# Patient Record
Sex: FEMALE | Race: WHITE | Employment: OTHER | URBAN - METROPOLITAN AREA
[De-identification: names, ages, dates, MRNs, and addresses within clinical notes are randomized per-mention and may not be internally consistent; named-entity substitution may affect disease eponyms.]

---

## 2021-01-04 ENCOUNTER — HOSPITAL ENCOUNTER (OUTPATIENT)
Dept: PREADMISSION TESTING | Age: 66
Discharge: HOME | End: 2021-01-04
Payer: MEDICARE

## 2021-01-04 ENCOUNTER — TRANSCRIBE ORDER (OUTPATIENT)
Dept: REGISTRATION | Age: 66
End: 2021-01-04

## 2021-01-04 DIAGNOSIS — M54.2 CERVICALGIA: ICD-10-CM

## 2021-01-04 DIAGNOSIS — M54.2 CERVICALGIA: Primary | ICD-10-CM

## 2021-01-04 LAB
ALBUMIN SERPL-MCNC: 3.7 G/DL (ref 3.4–5)
ALBUMIN/GLOB SERPL: 1.1 {RATIO} (ref 0.8–1.7)
ALP SERPL-CCNC: 121 U/L (ref 45–117)
ALT SERPL-CCNC: 18 U/L (ref 13–56)
ANION GAP SERPL CALC-SCNC: 3 MMOL/L (ref 3–18)
AST SERPL-CCNC: 9 U/L (ref 10–38)
BILIRUB SERPL-MCNC: 0.4 MG/DL (ref 0.2–1)
BUN SERPL-MCNC: 18 MG/DL (ref 7–18)
BUN/CREAT SERPL: 22 (ref 12–20)
CALCIUM SERPL-MCNC: 8.9 MG/DL (ref 8.5–10.1)
CHLORIDE SERPL-SCNC: 106 MMOL/L (ref 100–111)
CO2 SERPL-SCNC: 34 MMOL/L (ref 21–32)
CREAT SERPL-MCNC: 0.81 MG/DL (ref 0.6–1.3)
ERYTHROCYTE [DISTWIDTH] IN BLOOD BY AUTOMATED COUNT: 14.4 % (ref 11.6–14.5)
GLOBULIN SER CALC-MCNC: 3.4 G/DL (ref 2–4)
GLUCOSE SERPL-MCNC: 99 MG/DL (ref 74–99)
HCT VFR BLD AUTO: 41.8 % (ref 35–45)
HGB BLD-MCNC: 13.2 G/DL (ref 12–16)
MCH RBC QN AUTO: 25.7 PG (ref 24–34)
MCHC RBC AUTO-ENTMCNC: 31.6 G/DL (ref 31–37)
MCV RBC AUTO: 81.5 FL (ref 74–97)
PLATELET # BLD AUTO: 243 K/UL (ref 135–420)
PMV BLD AUTO: 9.4 FL (ref 9.2–11.8)
POTASSIUM SERPL-SCNC: 3.7 MMOL/L (ref 3.5–5.5)
PROT SERPL-MCNC: 7.1 G/DL (ref 6.4–8.2)
RBC # BLD AUTO: 5.13 M/UL (ref 4.2–5.3)
SODIUM SERPL-SCNC: 143 MMOL/L (ref 136–145)
WBC # BLD AUTO: 6.1 K/UL (ref 4.6–13.2)

## 2021-01-04 PROCEDURE — 85027 COMPLETE CBC AUTOMATED: CPT

## 2021-01-04 PROCEDURE — 80053 COMPREHEN METABOLIC PANEL: CPT

## 2021-01-04 PROCEDURE — 93005 ELECTROCARDIOGRAM TRACING: CPT

## 2021-01-04 PROCEDURE — 36415 COLL VENOUS BLD VENIPUNCTURE: CPT

## 2021-01-05 PROBLEM — M50.30 DDD (DEGENERATIVE DISC DISEASE), CERVICAL: Status: ACTIVE | Noted: 2021-01-05

## 2021-01-05 PROBLEM — M48.02 CERVICAL SPINAL STENOSIS: Status: ACTIVE | Noted: 2021-01-05

## 2021-01-05 PROBLEM — M50.20 HNP (HERNIATED NUCLEUS PULPOSUS), CERVICAL: Status: ACTIVE | Noted: 2021-01-05

## 2021-01-05 LAB
ATRIAL RATE: 69 BPM
BACTERIA SPEC CULT: NORMAL
BACTERIA SPEC CULT: NORMAL
CALCULATED P AXIS, ECG09: 67 DEGREES
CALCULATED R AXIS, ECG10: 83 DEGREES
CALCULATED T AXIS, ECG11: 12 DEGREES
DIAGNOSIS, 93000: NORMAL
P-R INTERVAL, ECG05: 166 MS
Q-T INTERVAL, ECG07: 418 MS
QRS DURATION, ECG06: 108 MS
QTC CALCULATION (BEZET), ECG08: 447 MS
SERVICE CMNT-IMP: NORMAL
VENTRICULAR RATE, ECG03: 69 BPM

## 2021-01-05 RX ORDER — SODIUM CHLORIDE 0.9 % (FLUSH) 0.9 %
5-40 SYRINGE (ML) INJECTION EVERY 8 HOURS
Status: CANCELLED | OUTPATIENT
Start: 2021-01-05

## 2021-01-05 RX ORDER — SODIUM CHLORIDE 0.9 % (FLUSH) 0.9 %
5-40 SYRINGE (ML) INJECTION AS NEEDED
Status: CANCELLED | OUTPATIENT
Start: 2021-01-05

## 2021-01-12 NOTE — H&P
Patient Name:  Karyna Rosa   YOB: 1955    Chief Complaint:  L4-5 and L5-S1 spondylolisthesis and degenerative disc disease of the cervical and lumbar spine. History of Chief Complaint:  Ms. Marina Mcginnis is a 78-year-old female who is being seen for L4-5 and L5-S1 spondylolisthesis and degenerative disc disease of the cervical and lumbar spine. She has had problems with lower back pain and spasms as well as neck pain and occasional bilateral upper extremity tingling. Her symptoms have been severe for the past three weeks. She injured her back at age 10. She says muscle relaxers and steroids used to help. The pain is now constant. She has problems with her balance with occasional falls. She has a shuffling gait and leans to the left. The pain is worse with activity and lifting. Aleve seems to help some. Nonsteroidal anti-inflammatories give her GI upset after having gastric bypass done 20 years ago. She has had no chiropractic treatment, no physical therapy, no injections, and no previous spinal surgeries. She had a DEXA scan done in 2019 which showed osteopenia. She says the epidural steroid injection seemed to help with her back quite a bit. She is standing quite a bit better and walking quite a bit better. There is not as much numbness and tingling. Her neck is still giving her problems and pain. She has numbness and tingling going into both arms. She has soreness with turning her head. She has headaches at times. She has pain shooting down to her right shoulder.     Past Medical/Surgical History:    Disease/Disorder Date Side Surgery Date Side Comment   Arthritis         Depression         Diabetes         High blood pressure         IBS - Irritable bowel syndrome         Osteopenia         Social anxiety disorder         Thyroid disease            Appendectomy 1997        Arthroscopy knee 1999 left       Breast reconstruction 2005        Gastric bypass 2003       Allergies:    Ingredient Reaction Medication Name Comment   SULFA (SULFONAMIDE ANTIBIOTICS)      MEPERIDINE      AMOXICILLIN TRIHYDRATE      ACETAMINOPHEN      OXYCODONE HCL      CODEINE      POTASSIUM CLAVULANATE        Current Medications:    Medication Directions   buspirone 5 mg tablet take 1 tablet by oral route 2 times every day   Effexor  mg capsule,extended release take 2 capsule by oral route  every day   triamterene 37.5 mg-hydrochlorothiazide 25 mg tablet    Xanax 2 mg tablet take 1 tablet by oral route as needed   Xanax XR 1 mg tablet,extended release take 1 tablet by oral route  every day     Social History:      SMOKING  Status Tobacco Type Units Per Day Yrs Used   Never smoker        ALCOHOL  There is no history of alcohol use. Family History:    Disease Detail Family Member Age Cause of Death Comments   Family history of Heart disease       Family history of High blood pressure       Alcoholism Brother  N    Arthritis Brother  N    Cancer, unknown Sister  N    Alcoholism Sister  N    Arthritis Sister  N    Diabetes mellitus Sister  N    Diabetes mellitus Father  N    Stroke Mother  N      Review of Systems:    Pertinent positives include anxiety, depression, joint stiffness, loss of balance, muscle weakness and numbness/tingling.   Pertinent negatives include blurred vision, chest pain, chills, cold, discharge of the eyes, dizziness, double vision, fever, headache, hearing loss, heart murmur, itching of the eyes, palpitations, redness of the eyes, rheumatic fever, ringing in ears, sore throat/hoarseness, weight change, abdominal pain, bipolar disorder, bladder/kidney infection, bloody stool, blood in urine, burning sensation, changes in mood, chronic cough, diarrhea, difficulty breathing, difficulty swallowing, fainting, frequent urinating, fracture/dislocation, gas/bloating, gout, hemorrhoids, incontinence, joint pain, memory loss, nausea/vomiting, pain on breathing, painful urination, psoriasis, rash/itching, Raynaud's phenomenon, rheumatoid disease, seizure disorder, shortness of breath, sprain/strain, swelling of feet, tendonitis, varicose veins and wheezing. Vitals:  Date BP Pulse Temp (F) Resp. (per min.) Height (Total in.) Weight (lbs.) BMI   05/29/2020     66.00 198.00 31.96   03/28/2017     66.00  31.96     Physical Examination:    General:  The patient appears healthy. Cardiovascular:  Arterial pulses are normal.  Skin:  The skin is normal appearing with no contusions or wounds noted. Heart- RRR  Lungs-CTA denia  Abdomen- +BS,soft,nontender  Musculoskeletal:  There is tenderness around the PSIS bilaterally. The thoracolumbar spine has normal kyphosis, a normal appearance, and no scoliosis. There is full range of motion of the cervical, thoracic, and lumbar spine and of the hips, knees, and ankles. Straight leg raising and crossed straight leg raising tests are negative. Neurological:  She has a positive Lhermitte's sign. There is no weakness in the thoracic, lumbar, or sacral spine or in the lower extremities or hips. Deep tendon reflexes are present and normal bilaterally. Ankle and knee jerks are normal with no clonus. Radiograph Examination:  AP, lateral, bilateral oblique, flexion and extension views of the cervical spine were obtained and interpreted in the office 5/29/2020 and reveal C5 to C7 degenerative disc disease. Review of her MRI scan of the cervical spine [facility] 6/22/2020 reveals severe spinal stenosis at C5-6 with degenerative disc disease at C6-7. Plan: Ms. Lis Combs, her , and I had a long discussion about the treatments for her cervical spine and degenerative disc disease including surgical intervention, the risks, and benefits as well as the different surgical approaches and decision making. We also discussed nonsurgical care for this condition including medications, injections, physical therapy, rehabilitation, activity modification, and brace utilization.  At this point, she would like to proceed with operative intervention. We will plan for C5-6 and C6-7 ACDF at her earliest convenience. We did discuss my relationship with [facility]. The risks versus the benefits as well as the alternatives were fully explained to the patient. Risks include, but are not limited to, paralysis, death, heart attack, stroke, pulmonary embolism, deep vein thrombosis, infection, failure to relieve pain, increase in back or arm pain, reherniation, scarring, spinal fluid leak, bowel or bladder dysfunction, bleeding, disease transmission, instrumentation failure, pseudarthrosis, difficulty swallowing, and need for revision surgery. The patient states full understanding of the risks and benefits and wishes to proceed.

## 2021-01-14 ENCOUNTER — HOSPITAL ENCOUNTER (OUTPATIENT)
Dept: PREADMISSION TESTING | Age: 66
Discharge: HOME | End: 2021-01-14
Payer: MEDICARE

## 2021-01-14 PROCEDURE — U0003 INFECTIOUS AGENT DETECTION BY NUCLEIC ACID (DNA OR RNA); SEVERE ACUTE RESPIRATORY SYNDROME CORONAVIRUS 2 (SARS-COV-2) (CORONAVIRUS DISEASE [COVID-19]), AMPLIFIED PROBE TECHNIQUE, MAKING USE OF HIGH THROUGHPUT TECHNOLOGIES AS DESCRIBED BY CMS-2020-01-R: HCPCS

## 2021-01-15 LAB — SARS-COV-2, COV2NT: NOT DETECTED

## 2021-01-20 ENCOUNTER — APPOINTMENT (OUTPATIENT)
Dept: GENERAL RADIOLOGY | Age: 66
End: 2021-01-20
Attending: ORTHOPAEDIC SURGERY
Payer: MEDICARE

## 2021-01-20 ENCOUNTER — ANESTHESIA (OUTPATIENT)
Dept: SURGERY | Age: 66
End: 2021-01-20
Payer: MEDICARE

## 2021-01-20 ENCOUNTER — HOSPITAL ENCOUNTER (OUTPATIENT)
Age: 66
Discharge: HOME HEALTH | End: 2021-01-20
Attending: ORTHOPAEDIC SURGERY | Admitting: ORTHOPAEDIC SURGERY
Payer: MEDICARE

## 2021-01-20 ENCOUNTER — ANESTHESIA EVENT (OUTPATIENT)
Dept: SURGERY | Age: 66
End: 2021-01-20
Payer: MEDICARE

## 2021-01-20 VITALS
HEIGHT: 66 IN | WEIGHT: 214 LBS | OXYGEN SATURATION: 96 % | RESPIRATION RATE: 18 BRPM | HEART RATE: 75 BPM | BODY MASS INDEX: 34.39 KG/M2 | TEMPERATURE: 97.3 F | SYSTOLIC BLOOD PRESSURE: 131 MMHG | DIASTOLIC BLOOD PRESSURE: 69 MMHG

## 2021-01-20 DIAGNOSIS — M48.02 CERVICAL SPINAL STENOSIS: Primary | ICD-10-CM

## 2021-01-20 PROBLEM — M50.20 HNP (HERNIATED NUCLEUS PULPOSUS), CERVICAL: Status: RESOLVED | Noted: 2021-01-05 | Resolved: 2021-01-20

## 2021-01-20 LAB
ABO + RH BLD: NORMAL
BLOOD GROUP ANTIBODIES SERPL: NORMAL
SPECIMEN EXP DATE BLD: NORMAL

## 2021-01-20 PROCEDURE — 76060000034 HC ANESTHESIA 1.5 TO 2 HR: Performed by: ORTHOPAEDIC SURGERY

## 2021-01-20 PROCEDURE — 74011000272 HC RX REV CODE- 272: Performed by: ORTHOPAEDIC SURGERY

## 2021-01-20 PROCEDURE — 77030034475 HC MISC IMPL SPN: Performed by: ORTHOPAEDIC SURGERY

## 2021-01-20 PROCEDURE — 77030006643: Performed by: STUDENT IN AN ORGANIZED HEALTH CARE EDUCATION/TRAINING PROGRAM

## 2021-01-20 PROCEDURE — C9359 IMPLNT,BON VOID FILLER-PUTTY: HCPCS | Performed by: ORTHOPAEDIC SURGERY

## 2021-01-20 PROCEDURE — 74011000250 HC RX REV CODE- 250: Performed by: ORTHOPAEDIC SURGERY

## 2021-01-20 PROCEDURE — 74011250636 HC RX REV CODE- 250/636: Performed by: NURSE ANESTHETIST, CERTIFIED REGISTERED

## 2021-01-20 PROCEDURE — 74011250636 HC RX REV CODE- 250/636: Performed by: STUDENT IN AN ORGANIZED HEALTH CARE EDUCATION/TRAINING PROGRAM

## 2021-01-20 PROCEDURE — 76210000021 HC REC RM PH II 0.5 TO 1 HR: Performed by: ORTHOPAEDIC SURGERY

## 2021-01-20 PROCEDURE — 77030040361 HC SLV COMPR DVT MDII -B: Performed by: ORTHOPAEDIC SURGERY

## 2021-01-20 PROCEDURE — 77030003029 HC SUT VCRL J&J -B: Performed by: ORTHOPAEDIC SURGERY

## 2021-01-20 PROCEDURE — 76010000153 HC OR TIME 1.5 TO 2 HR: Performed by: ORTHOPAEDIC SURGERY

## 2021-01-20 PROCEDURE — 74011000250 HC RX REV CODE- 250: Performed by: NURSE ANESTHETIST, CERTIFIED REGISTERED

## 2021-01-20 PROCEDURE — 77030038552 HC DRN WND MDII -A: Performed by: ORTHOPAEDIC SURGERY

## 2021-01-20 PROCEDURE — 77030008683 HC TU ET CUF COVD -A: Performed by: STUDENT IN AN ORGANIZED HEALTH CARE EDUCATION/TRAINING PROGRAM

## 2021-01-20 PROCEDURE — 77030020782 HC GWN BAIR PAWS FLX 3M -B: Performed by: ORTHOPAEDIC SURGERY

## 2021-01-20 PROCEDURE — 77030002986 HC SUT PROL J&J -A: Performed by: ORTHOPAEDIC SURGERY

## 2021-01-20 PROCEDURE — 76210000016 HC OR PH I REC 1 TO 1.5 HR: Performed by: ORTHOPAEDIC SURGERY

## 2021-01-20 PROCEDURE — 77030008462 HC STPLR SKN PROX J&J -A: Performed by: ORTHOPAEDIC SURGERY

## 2021-01-20 PROCEDURE — 86901 BLOOD TYPING SEROLOGIC RH(D): CPT

## 2021-01-20 PROCEDURE — 36415 COLL VENOUS BLD VENIPUNCTURE: CPT

## 2021-01-20 PROCEDURE — 77030020268 HC MISC GENERAL SUPPLY: Performed by: ORTHOPAEDIC SURGERY

## 2021-01-20 PROCEDURE — 77030010507 HC ADH SKN DERMBND J&J -B: Performed by: ORTHOPAEDIC SURGERY

## 2021-01-20 PROCEDURE — 77030008477 HC STYL SATN SLP COVD -A: Performed by: STUDENT IN AN ORGANIZED HEALTH CARE EDUCATION/TRAINING PROGRAM

## 2021-01-20 PROCEDURE — 77030014650 HC SEAL MTRX FLOSEL BAXT -C: Performed by: ORTHOPAEDIC SURGERY

## 2021-01-20 PROCEDURE — 2709999900 HC NON-CHARGEABLE SUPPLY: Performed by: ORTHOPAEDIC SURGERY

## 2021-01-20 PROCEDURE — 77030004402 HC BUR NEUR STRY -C: Performed by: ORTHOPAEDIC SURGERY

## 2021-01-20 PROCEDURE — 74011250636 HC RX REV CODE- 250/636: Performed by: ORTHOPAEDIC SURGERY

## 2021-01-20 PROCEDURE — C1713 ANCHOR/SCREW BN/BN,TIS/BN: HCPCS | Performed by: ORTHOPAEDIC SURGERY

## 2021-01-20 PROCEDURE — 77030009863 HC PIN CERV DISTR SYNT -B: Performed by: ORTHOPAEDIC SURGERY

## 2021-01-20 PROCEDURE — C1889 IMPLANT/INSERT DEVICE, NOC: HCPCS | Performed by: ORTHOPAEDIC SURGERY

## 2021-01-20 RX ORDER — METOCLOPRAMIDE HYDROCHLORIDE 5 MG/ML
INJECTION INTRAMUSCULAR; INTRAVENOUS AS NEEDED
Status: DISCONTINUED | OUTPATIENT
Start: 2021-01-20 | End: 2021-01-20 | Stop reason: HOSPADM

## 2021-01-20 RX ORDER — FENTANYL CITRATE 50 UG/ML
50 INJECTION, SOLUTION INTRAMUSCULAR; INTRAVENOUS
Status: DISCONTINUED | OUTPATIENT
Start: 2021-01-20 | End: 2021-01-20 | Stop reason: HOSPADM

## 2021-01-20 RX ORDER — NALOXONE HYDROCHLORIDE 4 MG/.1ML
SPRAY NASAL
Qty: 1 EACH | Refills: 0 | Status: SHIPPED | OUTPATIENT
Start: 2021-01-20

## 2021-01-20 RX ORDER — PHENYLEPHRINE HCL IN 0.9% NACL 1 MG/10 ML
SYRINGE (ML) INTRAVENOUS AS NEEDED
Status: DISCONTINUED | OUTPATIENT
Start: 2021-01-20 | End: 2021-01-20 | Stop reason: HOSPADM

## 2021-01-20 RX ORDER — GLYCOPYRROLATE 0.2 MG/ML
INJECTION INTRAMUSCULAR; INTRAVENOUS AS NEEDED
Status: DISCONTINUED | OUTPATIENT
Start: 2021-01-20 | End: 2021-01-20 | Stop reason: HOSPADM

## 2021-01-20 RX ORDER — SODIUM CHLORIDE, SODIUM LACTATE, POTASSIUM CHLORIDE, CALCIUM CHLORIDE 600; 310; 30; 20 MG/100ML; MG/100ML; MG/100ML; MG/100ML
50 INJECTION, SOLUTION INTRAVENOUS CONTINUOUS
Status: DISCONTINUED | OUTPATIENT
Start: 2021-01-20 | End: 2021-01-20 | Stop reason: HOSPADM

## 2021-01-20 RX ORDER — ROCURONIUM BROMIDE 10 MG/ML
INJECTION, SOLUTION INTRAVENOUS AS NEEDED
Status: DISCONTINUED | OUTPATIENT
Start: 2021-01-20 | End: 2021-01-20 | Stop reason: HOSPADM

## 2021-01-20 RX ORDER — PROPOFOL 10 MG/ML
INJECTION, EMULSION INTRAVENOUS AS NEEDED
Status: DISCONTINUED | OUTPATIENT
Start: 2021-01-20 | End: 2021-01-20 | Stop reason: HOSPADM

## 2021-01-20 RX ORDER — SODIUM CHLORIDE 0.9 % (FLUSH) 0.9 %
5-40 SYRINGE (ML) INJECTION AS NEEDED
Status: DISCONTINUED | OUTPATIENT
Start: 2021-01-20 | End: 2021-01-20 | Stop reason: HOSPADM

## 2021-01-20 RX ORDER — HYDROMORPHONE HYDROCHLORIDE 2 MG/ML
0.5 INJECTION, SOLUTION INTRAMUSCULAR; INTRAVENOUS; SUBCUTANEOUS AS NEEDED
Status: DISCONTINUED | OUTPATIENT
Start: 2021-01-20 | End: 2021-01-20 | Stop reason: HOSPADM

## 2021-01-20 RX ORDER — MIDAZOLAM HYDROCHLORIDE 1 MG/ML
INJECTION, SOLUTION INTRAMUSCULAR; INTRAVENOUS AS NEEDED
Status: DISCONTINUED | OUTPATIENT
Start: 2021-01-20 | End: 2021-01-20 | Stop reason: HOSPADM

## 2021-01-20 RX ORDER — SODIUM CHLORIDE 0.9 G/100ML
IRRIGANT IRRIGATION AS NEEDED
Status: DISCONTINUED | OUTPATIENT
Start: 2021-01-20 | End: 2021-01-20 | Stop reason: HOSPADM

## 2021-01-20 RX ORDER — DIPHENHYDRAMINE HYDROCHLORIDE 50 MG/ML
12.5 INJECTION, SOLUTION INTRAMUSCULAR; INTRAVENOUS
Status: DISCONTINUED | OUTPATIENT
Start: 2021-01-20 | End: 2021-01-20 | Stop reason: HOSPADM

## 2021-01-20 RX ORDER — CEFAZOLIN SODIUM/WATER 2 G/20 ML
2 SYRINGE (ML) INTRAVENOUS ONCE
Status: COMPLETED | OUTPATIENT
Start: 2021-01-20 | End: 2021-01-20

## 2021-01-20 RX ORDER — ONDANSETRON 2 MG/ML
INJECTION INTRAMUSCULAR; INTRAVENOUS AS NEEDED
Status: DISCONTINUED | OUTPATIENT
Start: 2021-01-20 | End: 2021-01-20 | Stop reason: HOSPADM

## 2021-01-20 RX ORDER — LIDOCAINE HYDROCHLORIDE 20 MG/ML
INJECTION, SOLUTION EPIDURAL; INFILTRATION; INTRACAUDAL; PERINEURAL AS NEEDED
Status: DISCONTINUED | OUTPATIENT
Start: 2021-01-20 | End: 2021-01-20 | Stop reason: HOSPADM

## 2021-01-20 RX ORDER — EPHEDRINE SULFATE/0.9% NACL/PF 50 MG/5 ML
SYRINGE (ML) INTRAVENOUS AS NEEDED
Status: DISCONTINUED | OUTPATIENT
Start: 2021-01-20 | End: 2021-01-20 | Stop reason: HOSPADM

## 2021-01-20 RX ORDER — BUPIVACAINE HYDROCHLORIDE 2.5 MG/ML
INJECTION, SOLUTION EPIDURAL; INFILTRATION; INTRACAUDAL AS NEEDED
Status: DISCONTINUED | OUTPATIENT
Start: 2021-01-20 | End: 2021-01-20 | Stop reason: HOSPADM

## 2021-01-20 RX ORDER — NALBUPHINE HYDROCHLORIDE 10 MG/ML
5 INJECTION, SOLUTION INTRAMUSCULAR; INTRAVENOUS; SUBCUTANEOUS
Status: DISCONTINUED | OUTPATIENT
Start: 2021-01-20 | End: 2021-01-20 | Stop reason: HOSPADM

## 2021-01-20 RX ORDER — OXYCODONE AND ACETAMINOPHEN 5; 325 MG/1; MG/1
1-1.5 TABLET ORAL
Qty: 84 TAB | Refills: 0 | Status: SHIPPED | OUTPATIENT
Start: 2021-01-20 | End: 2021-01-27

## 2021-01-20 RX ORDER — HYDROMORPHONE HYDROCHLORIDE 1 MG/ML
INJECTION, SOLUTION INTRAMUSCULAR; INTRAVENOUS; SUBCUTANEOUS AS NEEDED
Status: DISCONTINUED | OUTPATIENT
Start: 2021-01-20 | End: 2021-01-20 | Stop reason: HOSPADM

## 2021-01-20 RX ORDER — KETAMINE HCL IN 0.9 % NACL 50 MG/5 ML
SYRINGE (ML) INTRAVENOUS AS NEEDED
Status: DISCONTINUED | OUTPATIENT
Start: 2021-01-20 | End: 2021-01-20 | Stop reason: HOSPADM

## 2021-01-20 RX ORDER — NEOSTIGMINE METHYLSULFATE 1 MG/ML
INJECTION, SOLUTION INTRAVENOUS AS NEEDED
Status: DISCONTINUED | OUTPATIENT
Start: 2021-01-20 | End: 2021-01-20 | Stop reason: HOSPADM

## 2021-01-20 RX ORDER — SUCCINYLCHOLINE CHLORIDE 100 MG/5ML
SYRINGE (ML) INTRAVENOUS AS NEEDED
Status: DISCONTINUED | OUTPATIENT
Start: 2021-01-20 | End: 2021-01-20 | Stop reason: HOSPADM

## 2021-01-20 RX ORDER — NALOXONE HYDROCHLORIDE 0.4 MG/ML
0.04 INJECTION, SOLUTION INTRAMUSCULAR; INTRAVENOUS; SUBCUTANEOUS
Status: DISCONTINUED | OUTPATIENT
Start: 2021-01-20 | End: 2021-01-20 | Stop reason: HOSPADM

## 2021-01-20 RX ORDER — SODIUM CHLORIDE 0.9 % (FLUSH) 0.9 %
5-40 SYRINGE (ML) INJECTION EVERY 8 HOURS
Status: DISCONTINUED | OUTPATIENT
Start: 2021-01-20 | End: 2021-01-20 | Stop reason: HOSPADM

## 2021-01-20 RX ORDER — CYCLOBENZAPRINE HCL 10 MG
5-10 TABLET ORAL
Qty: 60 TAB | Refills: 0 | Status: SHIPPED | OUTPATIENT
Start: 2021-01-20 | End: 2021-02-09

## 2021-01-20 RX ORDER — DEXAMETHASONE SODIUM PHOSPHATE 4 MG/ML
INJECTION, SOLUTION INTRA-ARTICULAR; INTRALESIONAL; INTRAMUSCULAR; INTRAVENOUS; SOFT TISSUE AS NEEDED
Status: DISCONTINUED | OUTPATIENT
Start: 2021-01-20 | End: 2021-01-20 | Stop reason: HOSPADM

## 2021-01-20 RX ORDER — SODIUM CHLORIDE, SODIUM LACTATE, POTASSIUM CHLORIDE, CALCIUM CHLORIDE 600; 310; 30; 20 MG/100ML; MG/100ML; MG/100ML; MG/100ML
125 INJECTION, SOLUTION INTRAVENOUS CONTINUOUS
Status: DISCONTINUED | OUTPATIENT
Start: 2021-01-20 | End: 2021-01-20 | Stop reason: HOSPADM

## 2021-01-20 RX ADMIN — Medication 100 MCG: at 14:08

## 2021-01-20 RX ADMIN — Medication 10 MG: at 13:52

## 2021-01-20 RX ADMIN — Medication 10 MG: at 14:05

## 2021-01-20 RX ADMIN — MIDAZOLAM HYDROCHLORIDE 4 MG: 1 INJECTION, SOLUTION INTRAMUSCULAR; INTRAVENOUS at 13:44

## 2021-01-20 RX ADMIN — Medication 100 MCG: at 13:56

## 2021-01-20 RX ADMIN — Medication 3 MG: at 15:14

## 2021-01-20 RX ADMIN — ROCURONIUM BROMIDE 5 MG: 10 INJECTION, SOLUTION INTRAVENOUS at 13:49

## 2021-01-20 RX ADMIN — SODIUM CHLORIDE, SODIUM LACTATE, POTASSIUM CHLORIDE, AND CALCIUM CHLORIDE 125 ML/HR: 600; 310; 30; 20 INJECTION, SOLUTION INTRAVENOUS at 12:51

## 2021-01-20 RX ADMIN — Medication 100 MG: at 13:49

## 2021-01-20 RX ADMIN — Medication 10 MG: at 14:15

## 2021-01-20 RX ADMIN — SODIUM CHLORIDE, SODIUM LACTATE, POTASSIUM CHLORIDE, AND CALCIUM CHLORIDE: 600; 310; 30; 20 INJECTION, SOLUTION INTRAVENOUS at 15:21

## 2021-01-20 RX ADMIN — LIDOCAINE HYDROCHLORIDE 100 MG: 20 INJECTION, SOLUTION EPIDURAL; INFILTRATION; INTRACAUDAL; PERINEURAL at 13:49

## 2021-01-20 RX ADMIN — GLYCOPYRROLATE 0.4 MG: 0.2 INJECTION INTRAMUSCULAR; INTRAVENOUS at 15:14

## 2021-01-20 RX ADMIN — HYDROMORPHONE HYDROCHLORIDE 1 MG: 1 INJECTION, SOLUTION INTRAMUSCULAR; INTRAVENOUS; SUBCUTANEOUS at 13:44

## 2021-01-20 RX ADMIN — Medication 20 MG: at 14:47

## 2021-01-20 RX ADMIN — METOCLOPRAMIDE 10 MG: 5 INJECTION, SOLUTION INTRAMUSCULAR; INTRAVENOUS at 14:03

## 2021-01-20 RX ADMIN — HYDROMORPHONE HYDROCHLORIDE 1 MG: 1 INJECTION, SOLUTION INTRAMUSCULAR; INTRAVENOUS; SUBCUTANEOUS at 15:20

## 2021-01-20 RX ADMIN — PROPOFOL 150 MG: 10 INJECTION, EMULSION INTRAVENOUS at 13:49

## 2021-01-20 RX ADMIN — Medication 30 MG: at 14:29

## 2021-01-20 RX ADMIN — DEXAMETHASONE SODIUM PHOSPHATE 8 MG: 4 INJECTION, SOLUTION INTRAMUSCULAR; INTRAVENOUS at 14:03

## 2021-01-20 RX ADMIN — GLYCOPYRROLATE 0.1 MG: 0.2 INJECTION INTRAMUSCULAR; INTRAVENOUS at 13:44

## 2021-01-20 RX ADMIN — SODIUM CHLORIDE, SODIUM LACTATE, POTASSIUM CHLORIDE, AND CALCIUM CHLORIDE: 600; 310; 30; 20 INJECTION, SOLUTION INTRAVENOUS at 14:29

## 2021-01-20 RX ADMIN — ONDANSETRON HYDROCHLORIDE 4 MG: 2 INJECTION INTRAMUSCULAR; INTRAVENOUS at 14:03

## 2021-01-20 RX ADMIN — HYDROMORPHONE HYDROCHLORIDE 0.5 MG: 2 INJECTION, SOLUTION INTRAMUSCULAR; INTRAVENOUS; SUBCUTANEOUS at 16:13

## 2021-01-20 RX ADMIN — Medication 2 G: at 14:04

## 2021-01-20 RX ADMIN — ROCURONIUM BROMIDE 30 MG: 10 INJECTION, SOLUTION INTRAVENOUS at 14:00

## 2021-01-20 NOTE — ANESTHESIA POSTPROCEDURE EVALUATION
Post-Anesthesia Evaluation and Assessment    Cardiovascular Function/Vital Signs  Visit Vitals  /73   Pulse 81   Temp 36.2 °C (97.1 °F)   Resp 12   Ht 5' 6\" (1.676 m)   Wt 97.1 kg (214 lb)   SpO2 98%   BMI 34.54 kg/m²       Patient is status post Procedure(s):  CERVICAL 5-7 ANTERIOR CERVICAL DISC FUSION WITH C-ARM. Nausea/Vomiting: Controlled. Postoperative hydration reviewed and adequate. Pain:  Pain Scale 1: Numeric (0 - 10) (01/20/21 1620)  Pain Intensity 1: 3 (01/20/21 1620)   Managed. Neurological Status:   Neuro (WDL): Exceptions to WDL (01/20/21 1243)   At baseline. Mental Status and Level of Consciousness: Baseline and appropriate for discharge. Pulmonary Status:   O2 Device: Nasal cannula (01/20/21 1145)   Adequate oxygenation and airway patent. Complications related to anesthesia: None    Post-anesthesia assessment completed. No concerns. Patient has met all discharge requirements.     Signed By: Estefanía Taylor MD    January 20, 2021

## 2021-01-20 NOTE — PERIOP NOTES
Reviewed PTA medication list with patient/caregiver and patient/caregiver denies any additional medications. Patient admits to having a responsible adult care for them at home for at least 24 hours after surgery. Patient encouraged to use gown warming system and informed that using said warming gown to regulate body temperature prior to a procedure has been shown to help reduce the risks of blood clots and infection. Patient's pharmacy of choice verified and documented in PTA medication section. Dual skin assessment & fall risk band verification completed with SONJA Rivera RN.      Assessment completed by Estevan Noel RN

## 2021-01-20 NOTE — PROGRESS NOTES
Pt left stable with family*. Folder with discharge instruction and prescription given. Arm band shredded. IV access discontinue. Dressing CDI. No further questions. End of PeriOp care.       01/20/21 1725   Modified Caroline Score   Activity 2   Respiration 2   Circulation 2   Consciousness 2   O2 Saturation 2   Caroline Score 10   Vital Signs   Temp 97.3 °F (36.3 °C)   Pulse (Heart Rate) 75   Resp Rate 18   /69   Oxygen Therapy   O2 Sat (%) 96 %   Pain   Pain Scale 1 FLACC   Pain Intensity 1 0   Pain Reassessment 1 Yes

## 2021-01-20 NOTE — DISCHARGE INSTRUCTIONS
Dr. Char Pacheco Operative Instructions: Cervical Fusion    *YOU MUST AVOID SMOKING OR BEING AROUND ANYONE WHO SMOKES. AVOID ALL PRODUCTS THAT CONTAIN NICOTINE. DO NOT TAKE IBUPROFEN OR ANTI--INFLAMMATORIES, AS THESE MAY ALTER THE HEALING OF THE FUSION. Diet:   1. Begin with liquids and light foods such as jell-o or soups  2. Advance as tolerated to your regular diet if not nauseated. 3.  Swallowing difficulties may be experienced up to 2 weeks post-operatively. Modify food thickness as necessary. You may also obtain over-the-counter chloraseptic spray. Medications:  1. Strong oral narcotic pain medications have been prescribed for the first few days. Use only as directed. No pain medication is capable of taking away all the pain. Taking your pills at regular intervals will give you the best chance of having less pain. 2. If you need a refill PLEASE PLAN AHEAD. Call our office during regular hours (8-5). 3. Do not combine with alcoholic beverages. 4. Be careful as you walk, climb stairs or drive as mild dizziness is not unusual.  5. Do not take medications that have not been prescribed by your surgeon. 6. You may switch to over the counter pain medication of your choice as you become more comfortable. Activity and Restrictions:  1. You should not drive until you are clear by your surgeon at your post-operative visit. 2.  In regards to your cervical collar, you MUST wear this at all times until your first follow-up appointment. 3.  You should wear the collar even when sleeping. 4.  It can be removed for short periods of time as long as you are keeping your head centered over the shoulders without rotating or looking up or down. 5. You may take short walks inside and outside of your home and climb stairs. 6. You are to avoid work, housework, yard work, snow shoveling, lifting of more than a few pounds, overhead work or strenuous activity.   7. Avoid any excessive stretching or range-of-motion of the neck. Non-painful range-of-motion of the neck is allowed  8. Follow-up with Dr. Katy Bernard in 7-10 days. DRIVIN. You should not drive until after your follow-up appointment. 2.  You can be in a vehicle for short distances, but if you travel any long distance, please stop about every 30 minutes and walk/stretch. 3.  You should NEVER drive while taking narcotic medication. BATHING and INCISION CARE:  1. The incision may be tender to touch or feel numb: this is normal.   2.  Keep the incision clean and dry. Do not get the incision wet for 5 days. The incision will be closed with sutures under the skin and the skin will be glued. 3.  Do not apply any lotions, ointments or oils on the incision. 4.  If you notice any excessive swelling, redness, or persistent drainage around the incision, notify the office immediately. RETURN TO WORK :  1. The decision to return to work will be determined on an individual basis. 2.  Many people who have a strenuous job (construction, heavy labor, etc) may need to be off work for up to 8 weeks. 3.  If you need a work note, please let us know as soon as possible, and not the same day you are planning to return to work. NUTRITION :  1.  Good nutrition is an essential part of healing. 2.  You should eat a balanced diet each day, including fruits, vegetables, dairy products and protein. 3.  Remember to drink plenty of water. 4.  If you have not had a bowel movement within 3 days of surgery, you will need to use a laxative or suppository that can be obtained over-the-counter at your local pharmacy. BONE STIMULATOR:  1. A spinal bone stimulator may be prescribed for you under certain situations. 2.  A nurse will call you if one has been requested and discuss its use for approximately 4-6 months post-op every day. 3.  This device assists in bone healing and fusion.     CALL THE OFFICE:   If you have severe pain unrelieved by the medications, new numbness or tingling in your arms;   If you have a fever of 101.0°F or greater;    If you notice excessive swelling, redness, or persistent drainage from the incision or IV site; The [facility] office number is [phone] from 8:00am to 5:00pm Monday through Friday. After 5:00pm, on weekends, or holidays, please leave a message with our answering service and the doctor on-call will get back to you shortly. Patient armband removed and shredded      DISCHARGE SUMMARY from Nurse    PATIENT INSTRUCTIONS:    After general anesthesia or intravenous sedation, for 24 hours or while taking prescription Narcotics:  · Limit your activities  · Do not drive and operate hazardous machinery  · Do not make important personal or business decisions  · Do  not drink alcoholic beverages  · If you have not urinated within 8 hours after discharge, please contact your surgeon on call. Report the following to your surgeon:  · Excessive pain, swelling, redness or odor of or around the surgical area  · Temperature over 100.5  · Nausea and vomiting lasting longer than 4 hours or if unable to take medications  · Any signs of decreased circulation or nerve impairment to extremity: change in color, persistent  numbness, tingling, coldness or increase pain  · Any questions    What to do at Home:  Recommended activity: Activity as tolerated, Activity as tolerated and no driving for today and Ambulate in house,     If you experience any of the following symptoms as above, please follow up with Dr. Oscar Gomez. *  Please give a list of your current medications to your Primary Care Provider. *  Please update this list whenever your medications are discontinued, doses are      changed, or new medications (including over-the-counter products) are added. *  Please carry medication information at all times in case of emergency situations.     These are general instructions for a healthy lifestyle:    No smoking/ No tobacco products/ Avoid exposure to second hand smoke  Surgeon General's Warning:  Quitting smoking now greatly reduces serious risk to your health. Obesity, smoking, and sedentary lifestyle greatly increases your risk for illness    A healthy diet, regular physical exercise & weight monitoring are important for maintaining a healthy lifestyle    You may be retaining fluid if you have a history of heart failure or if you experience any of the following symptoms:  Weight gain of 3 pounds or more overnight or 5 pounds in a week, increased swelling in our hands or feet or shortness of breath while lying flat in bed. Please call your doctor as soon as you notice any of these symptoms; do not wait until your next office visit. Patient Education        Learning About Coronavirus [phone])  What is coronavirus (COVID-19)? COVID-19 is a disease caused by a new type of coronavirus. This illness was first found in December 2019. It has since spread worldwide. Coronaviruses are a large group of viruses. They cause the common cold. They also cause more serious illnesses like [place] respiratory syndrome (MERS) and severe acute respiratory syndrome (SARS). COVID-19 is caused by a novel coronavirus. That means it's a new type that has not been seen in people before. What are the symptoms? Coronavirus (COVID-19) symptoms may include:  · Fever. · Cough. · Trouble breathing. · Chills or repeated shaking with chills. · Muscle pain. · Headache. · Sore throat. · New loss of taste or smell. · Vomiting. · Diarrhea. In severe cases, COVID-19 can cause pneumonia and make it hard to breathe without help from a machine. It can cause death. How is it diagnosed? COVID-19 is diagnosed with a viral test. This may also be called a PCR test or antigen test. It looks for evidence of the virus in your breathing passages or lungs (respiratory system).   The test is most often done on a sample from the nose, throat, or lungs. It's sometimes done on a sample of saliva. One way a sample is collected is by putting a long swab into the back of your nose. How is it treated? Mild cases of COVID-19 can be treated at home. Serious cases need treatment in the hospital. Treatment may include medicines to reduce symptoms, plus breathing support such as oxygen therapy or a ventilator. Some people may be placed on their belly to help their oxygen levels. Treatments that may help people who have COVID-19 include:  Antiviral medicines. These medicines treat viral infections. Remdesivir is an example. Immune-based therapy. These medicines help the immune system fight COVID-19. One example is bamlanivimab. It's a monoclonal antibody. Blood thinners. These medicines help prevent blood clots. People with severe illness are at risk for blood clots. How can you protect yourself and others? The best way to protect yourself from getting sick is to:  · Avoid areas where there is an outbreak. · Avoid contact with people who may be infected. · Avoid crowds and try to stay at least 6 feet away from other people. · Wash your hands often, especially after you cough or sneeze. Use soap and water, and scrub for at least 20 seconds. If soap and water aren't available, use an alcohol-based hand . · Avoid touching your mouth, nose, and eyes. To help avoid spreading the virus to others:  · Stay home if you are sick or have been exposed to the virus. Don't go to school, work, or public areas. And don't use public transportation, ride-shares, or taxis unless you have no choice. · Wear a cloth face cover if you have to go to public areas. · Cover your mouth with a tissue when you cough or sneeze. Then throw the tissue in the trash and wash your hands right away. · If you're sick:  ? Leave your home only if you need to get medical care. But call the doctor's office first so they know you're coming.  And wear a face cover. ? Wear the face cover whenever you're around other people. It can help stop the spread of the virus when you cough or sneeze. ? Limit contact with pets and people in your home. If possible, stay in a separate bedroom and use a separate bathroom. ? Clean and disinfect your home every day. Use household  and disinfectant wipes or sprays. Take special care to clean things that you grab with your hands. These include doorknobs, remote controls, phones, and handles on your refrigerator and microwave. And don't forget countertops, tabletops, bathrooms, and computer keyboards. When should you call for help? Call 911 anytime you think you may need emergency care. For example, call if you have life-threatening symptoms, such as:    · You have severe trouble breathing. (You can't talk at all.)     · You have constant chest pain or pressure.     · You are severely dizzy or lightheaded.     · You are confused or can't think clearly.     · Your face and lips have a blue color.     · You pass out (lose consciousness) or are very hard to wake up. Call your doctor now or seek immediate medical care if:    · You have moderate trouble breathing. (You can't speak a full sentence.)     · You are coughing up blood (more than about 1 teaspoon).     · You have signs of low blood pressure. These include feeling lightheaded; being too weak to stand; and having cold, pale, clammy skin. Watch closely for changes in your health, and be sure to contact your doctor if:    · Your symptoms get worse.     · You are not getting better as expected. Call before you go to the doctor's office. Follow their instructions. And wear a cloth face cover. Current as of: December 18, 2020               Content Version: 12.7  © 2006-2021 [employer]. Care instructions adapted under license by [employer] (which disclaims liability or warranty for this information).  If you have questions about a medical condition or this instruction, always ask your healthcare professional. [address] any warranty or liability for your use of this information. The discharge information has been reviewed with the patient and spouse. The patient and spouse verbalized understanding. Discharge medications reviewed with the patient and spouse and appropriate educational materials and side effects teaching were provided.   ___________________________________________________________________________________________________________________________________

## 2021-01-20 NOTE — ANESTHESIA PREPROCEDURE EVALUATION
Relevant Problems   No relevant active problems       Anesthetic History   No history of anesthetic complications     Pertinent negatives: No PONV       Review of Systems / Medical History  Patient summary reviewed, nursing notes reviewed and pertinent labs reviewed    Pulmonary  Within defined limits            Pertinent negatives: No COPD, asthma and sleep apnea     Neuro/Psych         Psychiatric history  Pertinent negatives: No seizures and CVA   Cardiovascular    Hypertension            Pertinent negatives: No past MI and CHF  Exercise tolerance: >4 METS     GI/Hepatic/Renal  Within defined limits           Pertinent negatives: No liver disease and renal disease   Endo/Other    Diabetes: well controlled  Hypothyroidism  Arthritis     Other Findings            Physical Exam    Airway  Mallampati: II  TM Distance: 4 - 6 cm  Neck ROM: normal range of motion   Mouth opening: Normal     Cardiovascular  Regular rate and rhythm,  S1 and S2 normal,  no murmur, click, rub, or gallop  Rhythm: regular  Rate: normal         Dental    Dentition: Poor dentition     Pulmonary  Breath sounds clear to auscultation               Abdominal  GI exam deferred       Other Findings            Anesthetic Plan    ASA: 3  Anesthesia type: general          Induction: Intravenous  Anesthetic plan and risks discussed with: Patient

## 2021-01-20 NOTE — OP NOTES
Operative Note      Patient: Kamar Sorto               Sex: female          DOA: 1/20/2021         YOB: 1955      Age:  77 y.o. Preoperative Diagnosis: CERVICALGIA, DISC DEGENERATION C5-6, C6-7, FACET ARTHROSIS, OSTEOPHYTE, STENOSIS CERVICAL REGION, HYPERTENSION    Postoperative Diagnosis:  CERVICALGIA, DISC DEGENERATION C5-6, C6-7, FACET ARTHROSIS, OSTEOPHYTE, STENOSIS CERVICAL REGION, HYPERTENSION    Surgeon: Surgeon(s) and Role:     * Darrel Huang MD - Primary  Assistant: Lisa Ontiveros PA-C    OR Assitance: Physician Assistant: GEORGE Babb  Surg Asst-1: Gerhardt Gimenez    Anesthesia:  General    Procedure:  Procedure(s):  CERVICAL 5-7 ANTERIOR CERVICAL DISC FUSION WITH C-ARM     Estimated Blood Loss: 50cc                 Implants:   Implant Name Type Inv. Item Serial No.  Lot No. LRB No. Used Action   FIBERGRAFT BG PUTTY 2CC    PROSIDYAN_[id number] N/A 1 Implanted   [employer] ACIF 2C CpTi +HA Coated ACIF Wide Cage Peek 05 Degree x8mm     [id number] N/A 1 Implanted   [employer] ACIF 2 C CpTi +HA Coated ACIF Wide Cage Peek 05 Degreex 7mm     [id number] N/A 1 Implanted       Drains: RIC           Complications:  None              Indications: This is a 77y.o. year-old female who presents with significant neck and arm pain worsening ability to do activities of daily living. X-rays and MRI scan revealing spinal stenosis, degenerative disk disease and disk herniation. Having failed conservative care, he comes for operative intervention. Procedure Details:   After appropriate informed consent was obtained, the patient was taken to the operating suite and placed in a supine position on the operating table. Satisfactory general endotracheal anesthesia was induced. All pressure points were carefully padded. Perioperative antibiotics were given. The anterior neck was shaved, prepped, and draped in the usual sterile fashion.   Intraoperative fluoroscopy was used to localize the C5-6 level. A transverse linear incision was made in the left anterior neck directly and was carried down through the subcutaneous tissue. The platysma was divided with electrocautery in a transverse fashion and was undermined both superiorly and inferiorly. Dissection was continued down along the anterior border of the sternocleidomastoid muscle. The carotid artery was palpated and was swept laterally. A plane was identified between the paratracheal musculature and the sternocleidmastoid  into the prevertebral space and was developed both superiorly and inferiorly using blunt dissection. Intraoperative fluoroscopy and a spinal needle were used to localize theC 5-6 disc space. The prevertebral fascia was then incised longitudinally, and the longus colli muscles were swept laterally away from the bony elements of the spine on both sides. A self-retaining retractor with smooth blades was placed in the medial and lateral wound. 14 mm distraction pins were placed in the superior and inferior vertebral bodies. Next, the C5-6 and C6-7 discs were removed completely with curettes and pituitary rongeurs. Cartilaginous endplates were removed. Posterolateral osteophytes were removed using the 2mm Kerrison rongeur. The posterior longitudinal ligament was opened with nerve hook and generous foraminotomies were created bilaterally. The nerve roots and spinal cord were found to be freely decompressed. The wound was then irrigated copiously with antibiotic solution. Meticulous hemostasis was obtained. Osteophytes were removed from the posterior and anterior vertebral bodies with the vicenta. The cartilagenous endplates were also removed from each of the vertebral bodies down to bleeding subchondral bone. The interbody space were then sized for PEEK graft with trial sizers and bone graft, the interbody graft was then filled with bone graft and then impacted into the interbody space.   Each found to have a nice, snug fit. ANTERIOR INSTRUMENTATION:  Next, an anterior cervical plate was placed from C5-7. Screws were then placed sequentially starting with an awl then followed by self-tapping screws. Two screws were placed in each vertebra under fluoroscopic guidance. The screws visualized to locked into the plate with the wings of the screw head snapping under the plate edge. Intraoperative fluoroscopy confirmed the entire construct to be in good position. The wound was once more copiously irrigated. The self-retaining retractor was removed from the wound. Meticulous hemostasis was obtained. The esophagus was inspected and was found to be intact. A RIC drain was inserted. The platysma was closed using interrupted 2-0 Vicryl sutures. The skin edges were closed with 3-0 PDS suture and approximated using Dermabond. A sterile dressing was applied to the wound. The patient was then transferred back to the stretcher where satisfactory general endotracheal anesthesia was reversed. The patient was then taken to the Recovery Room in satisfactory condition. Assistant surgeon was needed throughout the procedure for managing bleeding, improving visualization and closure of the surgical wounds.

## 2021-01-20 NOTE — INTERVAL H&P NOTE
Update History & Physical 
 
The Patient's History and Physical was reviewed with the patient and I examined the patient. There was no change. The surgical site was confirmed by the patient and me. Plan:  The risk, benefits, expected outcome, and alternative to the recommended procedure have been discussed with the patient. Patient understands and wants to proceed with the procedure.  
 
Electronically signed by Angelica Banda MD on 1/20/2021 at 12:47 PM

## 2021-01-20 NOTE — PERIOP NOTES
Discharge instructions completed via phone call to Heather Garcia - opportunity for questions - AVS med list reviewed - tolerating po fluids - pt instructed on RIC drain care

## 2021-01-21 ENCOUNTER — TELEPHONE (OUTPATIENT)
Dept: OTHER | Age: 66
End: 2021-01-21

## 2021-01-21 NOTE — TELEPHONE ENCOUNTER
Juliane Wetzel called for follow up cervical surgery. Drain has been pulled. Discussed using ice, distraction, and repositioning to manage pain besides using medication. Reminded patient not to get the wound wet and to cover it before bathing. Reminded patient the importance of doing exercises as shown. Reminded to wear the neck collar and to follow any neck precautions per provider instructions. Reminded patient to sit up and rest with head elevated to help prevent swelling to the neck. Instructed patient to call provider if having any trouble swallowing. Reminded to healthy eat foods along with drinking plenty of fluids to promote healing. Reminded not  to take medication on an empty stomach to prevent nausea. Reminded to take a stool softener while taking a narcotic due to constipation being a side effect of anesthesia and narcotics. Taking medications as prescribed by provider.      Orthopedic Navigator

## 2021-04-22 ENCOUNTER — TRANSCRIBE ORDER (OUTPATIENT)
Dept: REGISTRATION | Age: 66
End: 2021-04-22

## 2021-04-22 ENCOUNTER — HOSPITAL ENCOUNTER (OUTPATIENT)
Dept: PREADMISSION TESTING | Age: 66
Discharge: HOME | End: 2021-04-22
Payer: MEDICARE

## 2021-04-22 DIAGNOSIS — M54.50 LUMBAGO: Primary | ICD-10-CM

## 2021-04-22 DIAGNOSIS — M54.50 LUMBAGO: ICD-10-CM

## 2021-04-22 LAB
ALBUMIN SERPL-MCNC: 3.4 G/DL (ref 3.4–5)
ALBUMIN/GLOB SERPL: 1 {RATIO} (ref 0.8–1.7)
ALP SERPL-CCNC: 122 U/L (ref 45–117)
ALT SERPL-CCNC: 16 U/L (ref 13–56)
ANION GAP SERPL CALC-SCNC: 0 MMOL/L (ref 3–18)
AST SERPL-CCNC: 10 U/L (ref 10–38)
ATRIAL RATE: 72 BPM
BILIRUB SERPL-MCNC: 0.4 MG/DL (ref 0.2–1)
BUN SERPL-MCNC: 16 MG/DL (ref 7–18)
BUN/CREAT SERPL: 20 (ref 12–20)
CALCIUM SERPL-MCNC: 8.5 MG/DL (ref 8.5–10.1)
CALCULATED P AXIS, ECG09: 72 DEGREES
CALCULATED R AXIS, ECG10: 87 DEGREES
CALCULATED T AXIS, ECG11: 29 DEGREES
CHLORIDE SERPL-SCNC: 105 MMOL/L (ref 100–111)
CO2 SERPL-SCNC: 37 MMOL/L (ref 21–32)
CREAT SERPL-MCNC: 0.81 MG/DL (ref 0.6–1.3)
DIAGNOSIS, 93000: NORMAL
ERYTHROCYTE [DISTWIDTH] IN BLOOD BY AUTOMATED COUNT: 14.1 % (ref 11.6–14.5)
GLOBULIN SER CALC-MCNC: 3.3 G/DL (ref 2–4)
GLUCOSE SERPL-MCNC: 152 MG/DL (ref 74–99)
HCT VFR BLD AUTO: 39.4 % (ref 35–45)
HGB BLD-MCNC: 12.1 G/DL (ref 12–16)
MCH RBC QN AUTO: 24.8 PG (ref 24–34)
MCHC RBC AUTO-ENTMCNC: 30.7 G/DL (ref 31–37)
MCV RBC AUTO: 80.9 FL (ref 74–97)
P-R INTERVAL, ECG05: 170 MS
PLATELET # BLD AUTO: 244 K/UL (ref 135–420)
PMV BLD AUTO: 9.3 FL (ref 9.2–11.8)
POTASSIUM SERPL-SCNC: 3.9 MMOL/L (ref 3.5–5.5)
PROT SERPL-MCNC: 6.7 G/DL (ref 6.4–8.2)
Q-T INTERVAL, ECG07: 410 MS
QRS DURATION, ECG06: 108 MS
QTC CALCULATION (BEZET), ECG08: 448 MS
RBC # BLD AUTO: 4.87 M/UL (ref 4.2–5.3)
SODIUM SERPL-SCNC: 142 MMOL/L (ref 136–145)
VENTRICULAR RATE, ECG03: 72 BPM
WBC # BLD AUTO: 5.1 K/UL (ref 4.6–13.2)

## 2021-04-22 PROCEDURE — 80053 COMPREHEN METABOLIC PANEL: CPT

## 2021-04-22 PROCEDURE — 36415 COLL VENOUS BLD VENIPUNCTURE: CPT

## 2021-04-22 PROCEDURE — 93005 ELECTROCARDIOGRAM TRACING: CPT

## 2021-04-22 PROCEDURE — 85027 COMPLETE CBC AUTOMATED: CPT

## 2021-04-23 LAB
BACTERIA SPEC CULT: NORMAL
BACTERIA SPEC CULT: NORMAL
SERVICE CMNT-IMP: NORMAL

## 2021-05-03 NOTE — H&P
Patient Name:  Cresencio Santana   YOB: 1955    Chief Complaint:  L4-5 and L5-S1 spondylolisthesis and degenerative disc disease of the cervical and lumbar spine. History of Chief Complaint:  Ms. Ashutosh Maradiaga is a 78-year-old female who is being seen for L4-5 and L5-S1 spondylolisthesis and degenerative disc disease of the  lumbar spine. She has had problems with lower back pain and spasms. Her symptoms have been severe for the past year. She injured her back at age 10. She says muscle relaxers and steroids used to help. The pain is now constant. She has problems with her balance with occasional falls. She has a shuffling gait and leans to the left. The pain is worse with activity and lifting. Aleve seems to help some. Nonsteroidal anti-inflammatories give her GI upset after having gastric bypass done 20 years ago. She is s/p C5 to C7 ACDF done 01/20/21. The biggest problem is with her lower back. She has soreness across the lower back. Bending, turning, and twisting causes pain. Standing for any length of time causes pain.     Past Medical/Surgical History:    Disease/Disorder Date Side Surgery Date Side Comment   Arthritis         Depression         Diabetes         High blood pressure         IBS - Irritable bowel syndrome         Osteopenia         Social anxiety disorder         Thyroid disease            Appendectomy 1997        Arthroscopy knee 1999 left       Breast reconstruction 2005        Gastric bypass 2003        Spinal fusion, cervical 01/20/2021  [facility] 02/05/2021 -C5-7     Allergies:    Ingredient Reaction Medication Name Comment   SULFA (SULFONAMIDE ANTIBIOTICS)      MEPERIDINE      AMOXICILLIN TRIHYDRATE      ACETAMINOPHEN      OXYCODONE HCL      CODEINE      POTASSIUM CLAVULANATE        Current Medications:    Medication Directions   buspirone 5 mg tablet take 1 tablet by oral route 2 times every day   Effexor  mg capsule,extended release take 2 capsule by oral route  every day triamterene 37.5 mg-hydrochlorothiazide 25 mg tablet    Xanax 2 mg tablet take 1 tablet by oral route as needed   Xanax XR 1 mg tablet,extended release take 1 tablet by oral route  every day     Social History:      SMOKING  Status Tobacco Type Units Per Day Yrs Used   Never smoker        ALCOHOL  There is no history of alcohol use. Family History:    Disease Detail Family Member Age Cause of Death Comments   Family history of Heart disease       Family history of High blood pressure       Alcoholism Brother  N    Arthritis Brother  N    Cancer, unknown Sister  N    Alcoholism Sister  N    Arthritis Sister  N    Diabetes mellitus Sister  N    Diabetes mellitus Father  N    Stroke Mother  N      Vitals:  Date BP Pulse Temp (F) Resp. (per min.) Height (Total in.) Weight (lbs.) BMI   02/04/2021     66.00  31.96   06/29/2020     66.00  31.96   05/29/2020     66.00  31.96   03/28/2017     66.00  31.96     Physical Examination:    General:  The patient appears healthy. Cardiovascular:  Arterial pulses are normal.  Skin:  The skin is normal appearing with no contusions or wounds noted. Heart- RRR  Lungs-CTA denia  Abdomen- +BS,soft,nontender  Musculoskeletal:  The thoracolumbar spine has normal kyphosis, a normal appearance, and no scoliosis. There is full range of motion of the cervical, thoracic, and lumbar spine and of the hips, knees, and ankles. She has positive straight leg raising on the right. Neurological:  There is no weakness in the thoracic, lumbar, or sacral spine or in the lower extremities or hips. Deep tendon reflexes are present and normal bilaterally. Ankle and knee jerks are normal with no clonus. Radiograph Examination:  AP, lateral, bilateral oblique, flexion and extension views of the lumbar spine were obtained and interpreted in the office 4/15/2021 and reveal L4 to S1 degenerative disc disease and spondylolisthesis at L4-5 and L5-S1.     An AP view of the pelvis was obtained and interpreted in the office  and reveals no periosteal reaction, no medullary lesions, no osteopenia, well-aligned joint spaces, no chondrolysis, and no fractures. Review of her MRI scan of the lumbar spine  [facility] 6/24/2020 reveals spondylolisthesis, degenerative disc disease, and foraminal stenosis at L4-5 and L5-S1. Plan: Ms. Patrick Denis, her , and I had a long discussion about the treatments regarding her back pain, leg pain, radiculopathy, and spinal stenosis including surgical intervention, the risks, and benefits as well as the different surgical approaches and decision making. We also discussed nonsurgical care for this condition including medications, injections, physical therapy, rehabilitation, activity modification, and brace utilization. At this point, she would like to proceed with operative intervention. We will plan for L4 to S1 decompression and fusion at her earliest convenience. The risks versus the benefits as well as the alternatives were fully explained to the patient. Risks include, but are not limited to, paralysis, death, heart attack, stroke, pulmonary embolism, deep vein thrombosis, infection, failure to relieve pain, increase in back or leg pain, reherniation of disc material, need for revision surgery, scarring, spinal fluid leak, bowel or bladder dysfunction, disease transmission, chronic graft site pain, instrumentation failure, pseudarthrosis, and the need for chronic ambulatory assist devices. The patient states full understanding of the risks and benefits and wishes to proceed.

## 2021-05-04 ENCOUNTER — HOSPITAL ENCOUNTER (OUTPATIENT)
Dept: PREADMISSION TESTING | Age: 66
Discharge: HOME | End: 2021-05-04
Payer: MEDICARE

## 2021-05-04 PROBLEM — M48.061 SPINAL STENOSIS, LUMBAR: Status: ACTIVE | Noted: 2021-05-04

## 2021-05-04 PROBLEM — M51.36 DDD (DEGENERATIVE DISC DISEASE), LUMBAR: Status: ACTIVE | Noted: 2021-05-04

## 2021-05-04 PROBLEM — M43.16 SPONDYLOLISTHESIS OF LUMBAR REGION: Status: ACTIVE | Noted: 2021-05-04

## 2021-05-04 PROCEDURE — U0003 INFECTIOUS AGENT DETECTION BY NUCLEIC ACID (DNA OR RNA); SEVERE ACUTE RESPIRATORY SYNDROME CORONAVIRUS 2 (SARS-COV-2) (CORONAVIRUS DISEASE [COVID-19]), AMPLIFIED PROBE TECHNIQUE, MAKING USE OF HIGH THROUGHPUT TECHNOLOGIES AS DESCRIBED BY CMS-2020-01-R: HCPCS

## 2021-05-04 RX ORDER — KETOROLAC TROMETHAMINE 30 MG/ML
30 INJECTION, SOLUTION INTRAMUSCULAR; INTRAVENOUS ONCE
Status: CANCELLED | OUTPATIENT
Start: 2021-05-04 | End: 2021-05-04

## 2021-05-04 RX ORDER — ALPRAZOLAM 0.5 MG/1
1 TABLET ORAL 2 TIMES DAILY
Status: CANCELLED | OUTPATIENT
Start: 2021-05-04

## 2021-05-04 RX ORDER — VENLAFAXINE HYDROCHLORIDE 150 MG/1
150 TABLET, EXTENDED RELEASE ORAL DAILY
Status: CANCELLED | OUTPATIENT
Start: 2021-05-04

## 2021-05-04 RX ORDER — ESCITALOPRAM OXALATE 10 MG/1
20 TABLET ORAL DAILY
Status: CANCELLED | OUTPATIENT
Start: 2021-05-04

## 2021-05-04 RX ORDER — LEVOTHYROXINE SODIUM 75 UG/1
75 TABLET ORAL
Status: CANCELLED | OUTPATIENT
Start: 2021-05-04

## 2021-05-04 RX ORDER — POTASSIUM CHLORIDE 750 MG/1
10 TABLET, FILM COATED, EXTENDED RELEASE ORAL 2 TIMES DAILY
Status: CANCELLED | OUTPATIENT
Start: 2021-05-04

## 2021-05-04 RX ORDER — TRIAMTERENE/HYDROCHLOROTHIAZID 37.5-25 MG
1 TABLET ORAL DAILY
Status: CANCELLED | OUTPATIENT
Start: 2021-05-04

## 2021-05-04 RX ORDER — PRAVASTATIN SODIUM 20 MG/1
20 TABLET ORAL
Status: CANCELLED | OUTPATIENT
Start: 2021-05-04

## 2021-05-05 LAB — SARS-COV-2, COV2NT: NOT DETECTED

## 2021-05-07 ENCOUNTER — ANESTHESIA EVENT (OUTPATIENT)
Dept: SURGERY | Age: 66
End: 2021-05-07
Payer: MEDICARE

## 2021-05-10 ENCOUNTER — ANESTHESIA (OUTPATIENT)
Dept: SURGERY | Age: 66
End: 2021-05-10
Payer: MEDICARE

## 2021-05-10 ENCOUNTER — HOSPITAL ENCOUNTER (OUTPATIENT)
Age: 66
Discharge: HOME HEALTH | End: 2021-05-10
Attending: ORTHOPAEDIC SURGERY | Admitting: ORTHOPAEDIC SURGERY
Payer: MEDICARE

## 2021-05-10 ENCOUNTER — APPOINTMENT (OUTPATIENT)
Dept: GENERAL RADIOLOGY | Age: 66
End: 2021-05-10
Attending: ORTHOPAEDIC SURGERY
Payer: MEDICARE

## 2021-05-10 VITALS
TEMPERATURE: 96.8 F | WEIGHT: 216.25 LBS | DIASTOLIC BLOOD PRESSURE: 67 MMHG | OXYGEN SATURATION: 94 % | BODY MASS INDEX: 34.75 KG/M2 | RESPIRATION RATE: 14 BRPM | HEART RATE: 73 BPM | HEIGHT: 66 IN | SYSTOLIC BLOOD PRESSURE: 125 MMHG

## 2021-05-10 DIAGNOSIS — M48.062 SPINAL STENOSIS OF LUMBAR REGION WITH NEUROGENIC CLAUDICATION: Primary | ICD-10-CM

## 2021-05-10 PROBLEM — M43.16 SPONDYLOLISTHESIS OF LUMBAR REGION: Status: RESOLVED | Noted: 2021-05-04 | Resolved: 2021-05-10

## 2021-05-10 PROBLEM — M48.061 SPINAL STENOSIS, LUMBAR: Status: RESOLVED | Noted: 2021-05-04 | Resolved: 2021-05-10

## 2021-05-10 LAB
ABO + RH BLD: NORMAL
BLOOD GROUP ANTIBODIES SERPL: NORMAL
GLUCOSE BLD STRIP.AUTO-MCNC: 104 MG/DL (ref 70–110)
GLUCOSE BLD STRIP.AUTO-MCNC: 106 MG/DL (ref 70–110)
SPECIMEN EXP DATE BLD: NORMAL

## 2021-05-10 PROCEDURE — 77030040830 HC CATH URETH FOL MDII -A: Performed by: ORTHOPAEDIC SURGERY

## 2021-05-10 PROCEDURE — 74011250636 HC RX REV CODE- 250/636: Performed by: NURSE ANESTHETIST, CERTIFIED REGISTERED

## 2021-05-10 PROCEDURE — 74011250637 HC RX REV CODE- 250/637: Performed by: ANESTHESIOLOGY

## 2021-05-10 PROCEDURE — 76210000022 HC REC RM PH II 1.5 TO 2 HR: Performed by: ORTHOPAEDIC SURGERY

## 2021-05-10 PROCEDURE — C1713 ANCHOR/SCREW BN/BN,TIS/BN: HCPCS | Performed by: ORTHOPAEDIC SURGERY

## 2021-05-10 PROCEDURE — 2709999900 HC NON-CHARGEABLE SUPPLY: Performed by: ORTHOPAEDIC SURGERY

## 2021-05-10 PROCEDURE — 77030003028 HC SUT VCRL J&J -A: Performed by: ORTHOPAEDIC SURGERY

## 2021-05-10 PROCEDURE — 76010000132 HC OR TIME 2.5 TO 3 HR: Performed by: ORTHOPAEDIC SURGERY

## 2021-05-10 PROCEDURE — 74011000258 HC RX REV CODE- 258: Performed by: ORTHOPAEDIC SURGERY

## 2021-05-10 PROCEDURE — 74011000250 HC RX REV CODE- 250: Performed by: NURSE ANESTHETIST, CERTIFIED REGISTERED

## 2021-05-10 PROCEDURE — 97166 OT EVAL MOD COMPLEX 45 MIN: CPT

## 2021-05-10 PROCEDURE — 76060000036 HC ANESTHESIA 2.5 TO 3 HR: Performed by: ORTHOPAEDIC SURGERY

## 2021-05-10 PROCEDURE — 76210000017 HC OR PH I REC 1.5 TO 2 HR: Performed by: ORTHOPAEDIC SURGERY

## 2021-05-10 PROCEDURE — 86901 BLOOD TYPING SEROLOGIC RH(D): CPT

## 2021-05-10 PROCEDURE — 74011250636 HC RX REV CODE- 250/636: Performed by: ORTHOPAEDIC SURGERY

## 2021-05-10 PROCEDURE — 77030020782 HC GWN BAIR PAWS FLX 3M -B: Performed by: ORTHOPAEDIC SURGERY

## 2021-05-10 PROCEDURE — 77030020269 HC MISC IMPL: Performed by: ORTHOPAEDIC SURGERY

## 2021-05-10 PROCEDURE — 97535 SELF CARE MNGMENT TRAINING: CPT

## 2021-05-10 PROCEDURE — 77030006643: Performed by: ANESTHESIOLOGY

## 2021-05-10 PROCEDURE — 77030008462 HC STPLR SKN PROX J&J -A: Performed by: ORTHOPAEDIC SURGERY

## 2021-05-10 PROCEDURE — 77030014650 HC SEAL MTRX FLOSEL BAXT -C: Performed by: ORTHOPAEDIC SURGERY

## 2021-05-10 PROCEDURE — 74011000250 HC RX REV CODE- 250: Performed by: ORTHOPAEDIC SURGERY

## 2021-05-10 PROCEDURE — 77030038552 HC DRN WND MDII -A: Performed by: ORTHOPAEDIC SURGERY

## 2021-05-10 PROCEDURE — 97116 GAIT TRAINING THERAPY: CPT

## 2021-05-10 PROCEDURE — 77030004402 HC BUR NEUR STRY -C: Performed by: ORTHOPAEDIC SURGERY

## 2021-05-10 PROCEDURE — 82962 GLUCOSE BLOOD TEST: CPT

## 2021-05-10 PROCEDURE — 77030003029 HC SUT VCRL J&J -B: Performed by: ORTHOPAEDIC SURGERY

## 2021-05-10 PROCEDURE — 77030008683 HC TU ET CUF COVD -A: Performed by: ANESTHESIOLOGY

## 2021-05-10 PROCEDURE — 97162 PT EVAL MOD COMPLEX 30 MIN: CPT

## 2021-05-10 PROCEDURE — 77030013708 HC HNDPC SUC IRR PULS STRY –B: Performed by: ORTHOPAEDIC SURGERY

## 2021-05-10 PROCEDURE — C9290 INJ, BUPIVACAINE LIPOSOME: HCPCS | Performed by: ORTHOPAEDIC SURGERY

## 2021-05-10 PROCEDURE — 36415 COLL VENOUS BLD VENIPUNCTURE: CPT

## 2021-05-10 PROCEDURE — 77030008477 HC STYL SATN SLP COVD -A: Performed by: ANESTHESIOLOGY

## 2021-05-10 PROCEDURE — 74011250636 HC RX REV CODE- 250/636: Performed by: ANESTHESIOLOGY

## 2021-05-10 PROCEDURE — 97530 THERAPEUTIC ACTIVITIES: CPT

## 2021-05-10 PROCEDURE — 77030025167 HC ELECTRD VES SEAL 1 MEDT -F: Performed by: ORTHOPAEDIC SURGERY

## 2021-05-10 DEVICE — ROD SPNL L70MM DIA55MM TI PRELORDOSED EXPEDIUM: Type: IMPLANTABLE DEVICE | Site: SPINE LUMBAR | Status: FUNCTIONAL

## 2021-05-10 DEVICE — SET SCR SPNL L25MM DIA5.5MM TI FOR 5.5MM ROD EXPEDIUM: Type: IMPLANTABLE DEVICE | Site: SPINE LUMBAR | Status: FUNCTIONAL

## 2021-05-10 DEVICE — SCREW SPNL L50MM DIA7MM TI SGL INNR POLYAX FOR 5.5MM ROD: Type: IMPLANTABLE DEVICE | Site: SPINE LUMBAR | Status: FUNCTIONAL

## 2021-05-10 DEVICE — SCREW SPNL L45MM DIA7MM TI SGL INNR POLYAX FOR 5.5MM ROD: Type: IMPLANTABLE DEVICE | Site: SPINE LUMBAR | Status: FUNCTIONAL

## 2021-05-10 RX ORDER — TIZANIDINE 4 MG/1
4 TABLET ORAL
Qty: 60 TAB | Refills: 0 | Status: SHIPPED | OUTPATIENT
Start: 2021-05-10

## 2021-05-10 RX ORDER — INSULIN LISPRO 100 [IU]/ML
INJECTION, SOLUTION INTRAVENOUS; SUBCUTANEOUS ONCE
Status: DISCONTINUED | OUTPATIENT
Start: 2021-05-10 | End: 2021-05-10 | Stop reason: HOSPADM

## 2021-05-10 RX ORDER — SODIUM CHLORIDE, SODIUM LACTATE, POTASSIUM CHLORIDE, CALCIUM CHLORIDE 600; 310; 30; 20 MG/100ML; MG/100ML; MG/100ML; MG/100ML
125 INJECTION, SOLUTION INTRAVENOUS CONTINUOUS
Status: DISCONTINUED | OUTPATIENT
Start: 2021-05-10 | End: 2021-05-10 | Stop reason: HOSPADM

## 2021-05-10 RX ORDER — FENTANYL CITRATE 50 UG/ML
25 INJECTION, SOLUTION INTRAMUSCULAR; INTRAVENOUS AS NEEDED
Status: COMPLETED | OUTPATIENT
Start: 2021-05-10 | End: 2021-05-10

## 2021-05-10 RX ORDER — PROPOFOL 10 MG/ML
INJECTION, EMULSION INTRAVENOUS AS NEEDED
Status: DISCONTINUED | OUTPATIENT
Start: 2021-05-10 | End: 2021-05-10 | Stop reason: HOSPADM

## 2021-05-10 RX ORDER — KETAMINE HYDROCHLORIDE 10 MG/ML
INJECTION, SOLUTION INTRAMUSCULAR; INTRAVENOUS AS NEEDED
Status: DISCONTINUED | OUTPATIENT
Start: 2021-05-10 | End: 2021-05-10 | Stop reason: HOSPADM

## 2021-05-10 RX ORDER — ONDANSETRON 2 MG/ML
INJECTION INTRAMUSCULAR; INTRAVENOUS AS NEEDED
Status: DISCONTINUED | OUTPATIENT
Start: 2021-05-10 | End: 2021-05-10 | Stop reason: HOSPADM

## 2021-05-10 RX ORDER — OXYCODONE AND ACETAMINOPHEN 5; 325 MG/1; MG/1
1-2 TABLET ORAL
Qty: 84 TAB | Refills: 0 | Status: SHIPPED | OUTPATIENT
Start: 2021-05-10 | End: 2021-05-17

## 2021-05-10 RX ORDER — MIDAZOLAM HYDROCHLORIDE 1 MG/ML
INJECTION, SOLUTION INTRAMUSCULAR; INTRAVENOUS AS NEEDED
Status: DISCONTINUED | OUTPATIENT
Start: 2021-05-10 | End: 2021-05-10 | Stop reason: HOSPADM

## 2021-05-10 RX ORDER — HYDROMORPHONE HYDROCHLORIDE 1 MG/ML
0.5 INJECTION, SOLUTION INTRAMUSCULAR; INTRAVENOUS; SUBCUTANEOUS
Status: DISCONTINUED | OUTPATIENT
Start: 2021-05-10 | End: 2021-05-10 | Stop reason: HOSPADM

## 2021-05-10 RX ORDER — EPHEDRINE SULFATE/0.9% NACL/PF 50 MG/5 ML
SYRINGE (ML) INTRAVENOUS AS NEEDED
Status: DISCONTINUED | OUTPATIENT
Start: 2021-05-10 | End: 2021-05-10 | Stop reason: HOSPADM

## 2021-05-10 RX ORDER — OXYCODONE HYDROCHLORIDE 5 MG/1
5 TABLET ORAL ONCE
Status: DISCONTINUED | OUTPATIENT
Start: 2021-05-10 | End: 2021-05-10 | Stop reason: HOSPADM

## 2021-05-10 RX ORDER — KETOROLAC TROMETHAMINE 30 MG/ML
INJECTION, SOLUTION INTRAMUSCULAR; INTRAVENOUS AS NEEDED
Status: DISCONTINUED | OUTPATIENT
Start: 2021-05-10 | End: 2021-05-10 | Stop reason: HOSPADM

## 2021-05-10 RX ORDER — MAGNESIUM SULFATE 100 %
4 CRYSTALS MISCELLANEOUS AS NEEDED
Status: DISCONTINUED | OUTPATIENT
Start: 2021-05-10 | End: 2021-05-10 | Stop reason: HOSPADM

## 2021-05-10 RX ORDER — SODIUM CHLORIDE 0.9 % (FLUSH) 0.9 %
5-40 SYRINGE (ML) INJECTION AS NEEDED
Status: DISCONTINUED | OUTPATIENT
Start: 2021-05-10 | End: 2021-05-10 | Stop reason: HOSPADM

## 2021-05-10 RX ORDER — DEXTROSE 50 % IN WATER (D50W) INTRAVENOUS SYRINGE
25-50 AS NEEDED
Status: DISCONTINUED | OUTPATIENT
Start: 2021-05-10 | End: 2021-05-10 | Stop reason: HOSPADM

## 2021-05-10 RX ORDER — GLYCOPYRROLATE 0.2 MG/ML
INJECTION INTRAMUSCULAR; INTRAVENOUS AS NEEDED
Status: DISCONTINUED | OUTPATIENT
Start: 2021-05-10 | End: 2021-05-10 | Stop reason: HOSPADM

## 2021-05-10 RX ORDER — TRANEXAMIC ACID 10 MG/ML
1 INJECTION, SOLUTION INTRAVENOUS ONCE
Status: COMPLETED | OUTPATIENT
Start: 2021-05-10 | End: 2021-05-10

## 2021-05-10 RX ORDER — SODIUM CHLORIDE 0.9 % (FLUSH) 0.9 %
5-40 SYRINGE (ML) INJECTION EVERY 8 HOURS
Status: DISCONTINUED | OUTPATIENT
Start: 2021-05-10 | End: 2021-05-10 | Stop reason: HOSPADM

## 2021-05-10 RX ORDER — FENTANYL CITRATE 50 UG/ML
INJECTION, SOLUTION INTRAMUSCULAR; INTRAVENOUS AS NEEDED
Status: DISCONTINUED | OUTPATIENT
Start: 2021-05-10 | End: 2021-05-10 | Stop reason: HOSPADM

## 2021-05-10 RX ORDER — LIDOCAINE HYDROCHLORIDE 20 MG/ML
INJECTION, SOLUTION EPIDURAL; INFILTRATION; INTRACAUDAL; PERINEURAL AS NEEDED
Status: DISCONTINUED | OUTPATIENT
Start: 2021-05-10 | End: 2021-05-10 | Stop reason: HOSPADM

## 2021-05-10 RX ORDER — MORPHINE SULFATE 15 MG/1
15 TABLET, FILM COATED, EXTENDED RELEASE ORAL
Qty: 14 TAB | Refills: 0 | Status: SHIPPED | OUTPATIENT
Start: 2021-05-10 | End: 2021-05-17

## 2021-05-10 RX ORDER — ROCURONIUM BROMIDE 10 MG/ML
INJECTION, SOLUTION INTRAVENOUS AS NEEDED
Status: DISCONTINUED | OUTPATIENT
Start: 2021-05-10 | End: 2021-05-10 | Stop reason: HOSPADM

## 2021-05-10 RX ORDER — CEPHALEXIN 500 MG/1
500 CAPSULE ORAL EVERY 6 HOURS
Qty: 4 CAP | Refills: 0 | Status: SHIPPED | OUTPATIENT
Start: 2021-05-10 | End: 2021-05-11

## 2021-05-10 RX ORDER — HYDROMORPHONE HYDROCHLORIDE 2 MG/ML
INJECTION, SOLUTION INTRAMUSCULAR; INTRAVENOUS; SUBCUTANEOUS AS NEEDED
Status: DISCONTINUED | OUTPATIENT
Start: 2021-05-10 | End: 2021-05-10 | Stop reason: HOSPADM

## 2021-05-10 RX ORDER — NEOSTIGMINE METHYLSULFATE 1 MG/ML
INJECTION, SOLUTION INTRAVENOUS AS NEEDED
Status: DISCONTINUED | OUTPATIENT
Start: 2021-05-10 | End: 2021-05-10 | Stop reason: HOSPADM

## 2021-05-10 RX ORDER — HYDROMORPHONE HYDROCHLORIDE 1 MG/ML
0.5 INJECTION, SOLUTION INTRAMUSCULAR; INTRAVENOUS; SUBCUTANEOUS
Status: COMPLETED | OUTPATIENT
Start: 2021-05-10 | End: 2021-05-10

## 2021-05-10 RX ORDER — ACETAMINOPHEN 500 MG
1000 TABLET ORAL
Status: COMPLETED | OUTPATIENT
Start: 2021-05-10 | End: 2021-05-10

## 2021-05-10 RX ORDER — CEFAZOLIN SODIUM/WATER 2 G/20 ML
2 SYRINGE (ML) INTRAVENOUS ONCE
Status: COMPLETED | OUTPATIENT
Start: 2021-05-10 | End: 2021-05-10

## 2021-05-10 RX ADMIN — Medication 1.5 MG: at 11:47

## 2021-05-10 RX ADMIN — FENTANYL CITRATE 25 MCG: 50 INJECTION, SOLUTION INTRAMUSCULAR; INTRAVENOUS at 13:05

## 2021-05-10 RX ADMIN — SODIUM CHLORIDE, SODIUM LACTATE, POTASSIUM CHLORIDE, AND CALCIUM CHLORIDE: 600; 310; 30; 20 INJECTION, SOLUTION INTRAVENOUS at 09:36

## 2021-05-10 RX ADMIN — HYDROMORPHONE HYDROCHLORIDE 0.5 MG: 1 INJECTION, SOLUTION INTRAMUSCULAR; INTRAVENOUS; SUBCUTANEOUS at 12:26

## 2021-05-10 RX ADMIN — HYDROMORPHONE HYDROCHLORIDE 0.5 MG: 1 INJECTION, SOLUTION INTRAMUSCULAR; INTRAVENOUS; SUBCUTANEOUS at 13:23

## 2021-05-10 RX ADMIN — Medication 10 MG: at 09:59

## 2021-05-10 RX ADMIN — HYDROMORPHONE HYDROCHLORIDE 0.5 MG: 1 INJECTION, SOLUTION INTRAMUSCULAR; INTRAVENOUS; SUBCUTANEOUS at 12:36

## 2021-05-10 RX ADMIN — ONDANSETRON HYDROCHLORIDE 4 MG: 2 INJECTION INTRAMUSCULAR; INTRAVENOUS at 09:18

## 2021-05-10 RX ADMIN — Medication 5 MG: at 11:27

## 2021-05-10 RX ADMIN — FENTANYL CITRATE 100 MCG: 50 INJECTION, SOLUTION INTRAMUSCULAR; INTRAVENOUS at 09:22

## 2021-05-10 RX ADMIN — LIDOCAINE HYDROCHLORIDE 100 MG: 20 INJECTION, SOLUTION EPIDURAL; INFILTRATION; INTRACAUDAL; PERINEURAL at 09:23

## 2021-05-10 RX ADMIN — FENTANYL CITRATE 25 MCG: 50 INJECTION, SOLUTION INTRAMUSCULAR; INTRAVENOUS at 13:41

## 2021-05-10 RX ADMIN — FENTANYL CITRATE 25 MCG: 50 INJECTION, SOLUTION INTRAMUSCULAR; INTRAVENOUS at 12:07

## 2021-05-10 RX ADMIN — TRANEXAMIC ACID 1 G: 10 INJECTION, SOLUTION INTRAVENOUS at 09:44

## 2021-05-10 RX ADMIN — SODIUM CHLORIDE, SODIUM LACTATE, POTASSIUM CHLORIDE, AND CALCIUM CHLORIDE 125 ML/HR: 600; 310; 30; 20 INJECTION, SOLUTION INTRAVENOUS at 07:40

## 2021-05-10 RX ADMIN — GLYCOPYRROLATE 0.2 MG: 0.2 INJECTION INTRAMUSCULAR; INTRAVENOUS at 09:18

## 2021-05-10 RX ADMIN — KETAMINE HYDROCHLORIDE 20 MG: 10 INJECTION, SOLUTION INTRAMUSCULAR; INTRAVENOUS at 09:22

## 2021-05-10 RX ADMIN — Medication 10 MG: at 10:34

## 2021-05-10 RX ADMIN — FENTANYL CITRATE 25 MCG: 50 INJECTION, SOLUTION INTRAMUSCULAR; INTRAVENOUS at 12:52

## 2021-05-10 RX ADMIN — SODIUM CHLORIDE, SODIUM LACTATE, POTASSIUM CHLORIDE, AND CALCIUM CHLORIDE: 600; 310; 30; 20 INJECTION, SOLUTION INTRAVENOUS at 10:48

## 2021-05-10 RX ADMIN — FENTANYL CITRATE 25 MCG: 50 INJECTION, SOLUTION INTRAMUSCULAR; INTRAVENOUS at 12:59

## 2021-05-10 RX ADMIN — SODIUM CHLORIDE, SODIUM LACTATE, POTASSIUM CHLORIDE, AND CALCIUM CHLORIDE 125 ML/HR: 600; 310; 30; 20 INJECTION, SOLUTION INTRAVENOUS at 14:06

## 2021-05-10 RX ADMIN — HYDROMORPHONE HYDROCHLORIDE 0.5 MG: 1 INJECTION, SOLUTION INTRAMUSCULAR; INTRAVENOUS; SUBCUTANEOUS at 12:46

## 2021-05-10 RX ADMIN — Medication 10 MG: at 09:42

## 2021-05-10 RX ADMIN — PROPOFOL 150 MG: 10 INJECTION, EMULSION INTRAVENOUS at 09:23

## 2021-05-10 RX ADMIN — Medication 10 MG: at 10:24

## 2021-05-10 RX ADMIN — HYDROMORPHONE HYDROCHLORIDE 0.5 MG: 1 INJECTION, SOLUTION INTRAMUSCULAR; INTRAVENOUS; SUBCUTANEOUS at 13:33

## 2021-05-10 RX ADMIN — MIDAZOLAM 2 MG: 1 INJECTION INTRAMUSCULAR; INTRAVENOUS at 09:15

## 2021-05-10 RX ADMIN — ACETAMINOPHEN 1000 MG: 500 TABLET ORAL at 07:47

## 2021-05-10 RX ADMIN — Medication 20 MG: at 09:36

## 2021-05-10 RX ADMIN — ROCURONIUM BROMIDE 35 MG: 10 INJECTION, SOLUTION INTRAVENOUS at 09:24

## 2021-05-10 RX ADMIN — GLYCOPYRROLATE 0.3 MG: 0.2 INJECTION INTRAMUSCULAR; INTRAVENOUS at 11:47

## 2021-05-10 RX ADMIN — KETOROLAC TROMETHAMINE 30 MG: 30 INJECTION, SOLUTION INTRAMUSCULAR; INTRAVENOUS at 11:45

## 2021-05-10 RX ADMIN — KETAMINE HYDROCHLORIDE 30 MG: 10 INJECTION, SOLUTION INTRAMUSCULAR; INTRAVENOUS at 09:51

## 2021-05-10 RX ADMIN — HYDROMORPHONE HYDROCHLORIDE 1 MG: 2 INJECTION, SOLUTION INTRAMUSCULAR; INTRAVENOUS; SUBCUTANEOUS at 10:16

## 2021-05-10 RX ADMIN — Medication 10 MG: at 09:52

## 2021-05-10 RX ADMIN — CEFAZOLIN 2 G: 1 INJECTION, POWDER, FOR SOLUTION INTRAVENOUS at 10:05

## 2021-05-10 RX ADMIN — FENTANYL CITRATE 25 MCG: 50 INJECTION, SOLUTION INTRAMUSCULAR; INTRAVENOUS at 13:36

## 2021-05-10 RX ADMIN — Medication 10 MG: at 11:16

## 2021-05-10 RX ADMIN — Medication 10 MG: at 10:44

## 2021-05-10 RX ADMIN — HYDROMORPHONE HYDROCHLORIDE 0.5 MG: 1 INJECTION, SOLUTION INTRAMUSCULAR; INTRAVENOUS; SUBCUTANEOUS at 12:16

## 2021-05-10 NOTE — PROGRESS NOTES
Problem: Mobility Impaired (Adult and Pediatric)  Goal: *Acute Goals and Plan of Care (Insert Text)  Description: Physical Therapy short term goals initiated 05/10/21, to be achieved in 2 days. Pt will:   1. Perform bed mobility with S in prep for OOB activity. 2. Perform sit <> stand transfers with RW and S in prep for functional mobility and ambulation. 3. Ambulate 100 ft with RW and S in prep for household and community mobility. 4. Ascend/descend 3 stairs with LRAD and S for safe home entry. Note: [x]  Patient has met MD gretchen payan for d/c home   [x]  Recommend HH with 24 hour adult care   []  Benefit from additional acute PT session to address:      PHYSICAL THERAPY EVALUATION    Patient: Laura Cardoza (87 y.o. female)  Date: 5/10/2021  Primary Diagnosis: Spinal stenosis, lumbar [M48.061]  Procedure(s) (LRB):  LUMBAR 4-SACRAL 1 DECOMPRESSION AND FUSION W/C-ARM,DEPUY (N/A) Day of Surgery   Precautions:  Fall, Spinal  PLOF: Pt was independent with household and community mobility with no AD PTA. ASSESSMENT :  Based on the objective data described below, the patient presents with decr B LE strength and ROM, decr activity tolerance, decr balance resulting in deficits in bed mobility, transfers, and gait. Pt supine in stretcher on PT entry, reporting 0/10 pain in back. Pt drowsy and reported dizziness throughout session that limited activity tolerance, BP WNL t/o session. Pt performed supine to sit via log roll technique with CGA and vc for sequencing. Pt donned LSO sitting EOB, pt educated on appropriate use of LSO with OOB mobility. Pt required CGA for sit <> stand transfers with RW and vc for safe hand placement. Pt ambulated 60ft in cruz with RW and CGA demonstrating decr speed and reciprocal pattern. Pt ascended/descended 1 step with RW and vc for safety and sequencing and techniques for stair negotiation with no HR.  Pt educated on importance of early mobility, home safety, and energy conservation. Pt left sitting in recliner with all needs met and all questions answered. Pt would benefit from additional skilled therapy after discharge in order to incr functional mobility and promote return to PLOF. Recommend HHPT after discharge. Patient will benefit from skilled intervention to address the above impairments. Patient's rehabilitation potential is considered to be Good  Factors which may influence rehabilitation potential include:   [x]         None noted  []         Mental ability/status  []         Medical condition  []         Home/family situation and support systems  []         Safety awareness  []         Pain tolerance/management  []         Other:      PLAN :  Recommendations and Planned Interventions:   [x]           Bed Mobility Training             []    Neuromuscular Re-Education  [x]           Transfer Training                   []    Orthotic/Prosthetic Training  [x]           Gait Training                          [x]    Modalities  [x]           Therapeutic Exercises           []    Edema Management/Control  [x]           Therapeutic Activities            [x]    Family Training/Education  [x]           Patient Education  []           Other (comment):    Frequency/Duration: Patient will be followed by physical therapy twice daily to address goals. Discharge Recommendations: Home Health  Further Equipment Recommendations for Discharge: N/A     SUBJECTIVE:   Patient stated \"My head feels woozy.     OBJECTIVE DATA SUMMARY:     Past Medical History:   Diagnosis Date    Arthritis     Cancer ([facility] [address]     squam cell leg    Diabetes ([facility] [address]     pre diabetic    Hypertension 2000    Psychiatric disorder     depression, anxiety    Thyroid disease      Past Surgical History:   Procedure Laterality Date    HX APPENDECTOMY      HX CATARACT REMOVAL Bilateral     HX CERVICAL FUSION  2021    HX GASTRIC BYPASS  2003    HX GYN      BTL    HX ORTHOPAEDIC Left     bunyonectomy    HX ORTHOPAEDIC Left     foot reconstruction    [facility] BREAST SURGERY PROCEDURE UNLISTED      reduction     Barriers to Learning/Limitations: yes;  other post-anesthesia  Compensate with: Verbal Cues and Tactile Cues  Home Situation:  Home Situation  Home Environment: Private residence  # Steps to Enter: 3  Rails to Enter: No  One/Two Story Residence: One story  Living Alone: No  Support Systems: Spouse/Significant Other/Partner  Patient Expects to be Discharged to[de-identified] Private residence  Current DME Used/Available at Home: Walker, rolling  Tub or Shower Type: Shower  Critical Behavior:  Neurologic State: Drowsy  Orientation Level: Oriented X4  Cognition: Follows commands  Safety/Judgement: Fall prevention;Home safety  Psychosocial  Patient Behaviors: Calm; Cooperative  Family  Behaviors: Supportive  Skin Condition/Temp: Warm;Diaphoretic  Family  Behaviors: Supportive  Skin Integrity: Incision (comment)(surgical)  Skin Integumentary  Skin Color: Appropriate for ethnicity  Skin Condition/Temp: Warm;Diaphoretic  Skin Integrity: Incision (comment)(surgical)  Strength:    Strength: Generally decreased, functional  Tone & Sensation:   Tone: Normal  Sensation: Intact  Range Of Motion:  AROM: Generally decreased, functional  Functional Mobility:  Bed Mobility:  Rolling: Contact guard assistance(vc)  Supine to Sit: Contact guard assistance(vc)  Scooting: Contact guard assistance  Transfers:  Sit to Stand: Contact guard assistance; Additional time(vc)  Stand to Sit: Contact guard assistance; Additional time(vc)  Balance:   Sitting: Intact  Standing: Intact; With support  Ambulation/Gait Training:  Distance (ft): 100 Feet (ft)  Assistive Device: Gait belt;Walker, rolling  Ambulation - Level of Assistance: Contact guard assistance  Gait Abnormalities: Decreased step clearance  Speed/Katarzyna: Slow  Step Length: Left shortened;Right shortened  Interventions: Safety awareness training; Tactile cues; Verbal cues  Stairs:  Number of Stairs Trained:  1(box step)  Stairs - Level of Assistance: Contact guard assistance(vc)  Rail Use: (RW)  Pain:  Pain level pre-treatment: 0/10   Pain level post-treatment: 0/10   Pain Intervention(s) : Medication (see MAR); Rest, Ice, Repositioning  Response to intervention: Nurse notified, See doc flow    Activity Tolerance:   Pt tolerated amb in cruz with RW and CGA with no LOB, limited activity tolerance d/t \"woozy\"/dizziness, BP WNL t/o session. Please refer to the flowsheet for vital signs taken during this treatment. After treatment:   [x]         Patient left in no apparent distress sitting up in chair  []         Patient left in no apparent distress in bed  [x]         Call bell left within reach  [x]         Nursing notified  []         Caregiver present  []         Bed alarm activated  []         SCDs applied    COMMUNICATION/EDUCATION:   [x]         Role of Physical Therapy in the acute care setting. [x]         Fall prevention education was provided and the patient/caregiver indicated understanding. [x]         Patient/family have participated as able in goal setting and plan of care. [x]         Patient/family agree to work toward stated goals and plan of care. []         Patient understands intent and goals of therapy, but is neutral about his/her participation. []         Patient is unable to participate in goal setting/plan of care: ongoing with therapy staff.  []         Other:     Thank you for this referral.  Loree Florence   Time Calculation: 38 mins      Eval Complexity: History: LOW Complexity : Zero comorbidities / personal factors that will impact the outcome / POCExam:LOW Complexity : 1-2 Standardized tests and measures addressing body structure, function, activity limitation and / or participation in recreation  Presentation: LOW Complexity : Stable, uncomplicated  Clinical Decision Making:Low Complexity    Overall Complexity:LOW

## 2021-05-10 NOTE — ANESTHESIA POSTPROCEDURE EVALUATION
Post-Anesthesia Evaluation and Assessment    Cardiovascular Function/Vital Signs  Visit Vitals  BP (!) 101/50   Pulse 67   Temp 36.2 °C (97.2 °F)   Resp 16   Ht 5' 6\" (1.676 m)   Wt 98.1 kg (216 lb 4 oz)   SpO2 98%   BMI 34.90 kg/m²       Patient is status post Procedure(s):  LUMBAR 4-SACRAL 1 DECOMPRESSION AND FUSION W/C-ARM,DEPUY. Nausea/Vomiting: Controlled. Postoperative hydration reviewed and adequate. Pain:  Pain Scale 1: Numeric (0 - 10) (05/10/21 1345)  Pain Intensity 1: 4 (05/10/21 1345)   Managed. Neurological Status:   Neuro (WDL): Within Defined Limits (05/10/21 1220)   At baseline. Mental Status and Level of Consciousness: Baseline and stable. Pulmonary Status:   O2 Device: None (Room air) (05/10/21 1224)   Adequate oxygenation and airway patent. Complications related to anesthesia: None    Post-anesthesia assessment completed. No concerns. Patient has met all discharge requirements.     Signed By: Daniel Guadalupe MD

## 2021-05-10 NOTE — DISCHARGE INSTRUCTIONS
[facility]  Dr. Vilma Wheatley ANYONE WHO SMOKES. AVOID ALL PRODUCTS THAT CONTAIN NICOTINE. DO NOT TAKE IBUPROFEN OR ANTI--INFLAMMATORIES, AS THESE MAY ALTER THE HEALING OF THE FUSION. ACTIVITIES :  *The first week after surgery   1. You may be up and walking about the house. 2.  Activities around the house, such as washing dishes, fixing light meals, and your own personal care are fine. 3.  Avoid strenuous activities, such as vacuuming, lifting laundry or grocery bags. 4.  Do not lift anything heavier than 1 gallon of milk (or about 5-8 pounds). 5.  Do not bend over to  items from the ground level until 3 months post-op. *Week 2 and beyond  1. You may gradually increase your activities, but still avoid heavy lifting, pushing/pulling. 2.  Walking is the best way to rebuild strength and stamina. Start SLOWLY and gradually increase the distance a little every week. 3.  Walk at a pace that avoids fatigue or severe pain. Do not try to walk several blocks the first day! As you increase the distance, you may feel tired. If so, stop and rest.   4.  You should be able to walk several blocks by your first clinic visit. 5.  Follow-up with Dr. Bell Graham in 10-14 days. BATHING and INCISION CARE:  1. The incision may be tender to touch or feel numb: this is normal.   2.  Keep the incision clean and dry. Do not get incision wet for 5 days. The incision will be closed with sutures under the skin and the skin will be glued. 3.  Do not apply any lotions, ointments or oils on the incision. 4.  If you notice any excessive swelling, redness, or persistent drainage around the incision, notify the office immediately. DRIVIN. You should not drive until after your follow-up appointment. 2.  You can be in a vehicle for short distances, but if you travel any long distance, please stop about every 30 minutes and walk/stretch. 3.  You should NEVER drive while taking narcotic medication. RETURN TO WORK :  1. The decision to return to work will be determined on an individual basis. 2.  Many people who have a strenuous job (construction, heavy labor, etc) may need to be off work for up to 12 weeks. 3.  If you need a work note, please let us know as soon as possible, and not the same day you are planning to return to work. NUTRITION :  1.  Good nutrition is an essential part of healing. 2.  You should eat a balanced diet each day, including fruits, vegetables, dairy products and protein. 3.  Remember to drink plenty of water. 4.  If you have not had a bowel movement within 3 days of surgery, you will need to use a laxative or suppository that can be obtained over-the-counter at your local pharmacy. BONE STIMULATOR:  1. A spinal bone stimulator may be prescribed for you under certain situations. 2.  A nurse will call you if one has been requested and discuss its use for approximately 4-6 months post-op every day. 3.  This device assists in bone healing and fusion. MEDICATIONS -  1. You may resume the medications you were taking before surgery, with the general exception of (or DO NOT TAKE) non-steroidal anti-inflammatory medications, such as Motrin, Aleve, Advil Naprosyn, Ibuprofen or aspirin. 2.  You will receive a prescription for pain medication at discharge from the hospital. The pain medication works best if taken before the pain becomes severe. 3.  To reduce stomach upset, always take the medication with food. 4.  Begin to wean yourself off the pain medication during the second week after discharge. 5.  If you need a refill, please call the office during working hours at least 2 days before your prescription runs out. Do not wait until your bottle is empty to call for a refill. 6.  DO NOT drive if you are taking narcotic pain medications.     HOME HEALTH CARE:  1.   A home health care service has been set-up for you to help assist you once you leave the hospital.  2.  They will contact you either before you leave the hospital or within 24 hours once you have been discharged home. 3. A nurse will assist you with your dressing changes and a Physical Therapist with help you with your therapy needs. CALL THE OFFICE:   If you have severe pain unrelieved by the medications, new numbness or tingling in your legs;   If you have a fever of 101.0°F or greater;    If you notice excessive swelling, redness, or persistent drainage from the incision or IV site; [facility] office number is [phone] from 8:00am to 5:00pm Monday through Friday. After 5:00pm, on weekends, or holidays, please leave a message with our answering service and the doctor on-call will get back to you shortly. DISCHARGE SUMMARY from Nurse    PATIENT INSTRUCTIONS:    After general anesthesia or intravenous sedation, for 24 hours or while taking prescription Narcotics:  · Limit your activities  · Do not drive and operate hazardous machinery  · Do not make important personal or business decisions  · Do  not drink alcoholic beverages  · If you have not urinated within 8 hours after discharge, please contact your surgeon on call. Report the following to your surgeon:  · Excessive pain, swelling, redness or odor of or around the surgical area  · Temperature over 100.5  · Nausea and vomiting lasting longer than 4 hours or if unable to take medications  · Any signs of decreased circulation or nerve impairment to extremity: change in color, persistent  numbness, tingling, coldness or increase pain  · Any questions    What to do at Home:  Recommended activity: Activity as tolerated and no driving for today,     If you experience any of the following symptoms as per above, please follow up with Dr Kathie Jesus at [phone]. *  Please give a list of your current medications to your Primary Care Provider.     *  Please update this list whenever your medications are discontinued, doses are      changed, or new medications (including over-the-counter products) are added. *  Please carry medication information at all times in case of emergency situations. These are general instructions for a healthy lifestyle:    No smoking/ No tobacco products/ Avoid exposure to second hand smoke  Surgeon General's Warning:  Quitting smoking now greatly reduces serious risk to your health. Obesity, smoking, and sedentary lifestyle greatly increases your risk for illness    A healthy diet, regular physical exercise & weight monitoring are important for maintaining a healthy lifestyle    You may be retaining fluid if you have a history of heart failure or if you experience any of the following symptoms:  Weight gain of 3 pounds or more overnight or 5 pounds in a week, increased swelling in our hands or feet or shortness of breath while lying flat in bed. Please call your doctor as soon as you notice any of these symptoms; do not wait until your next office visit. 10 Things to Do When You Have COVID-19    Stay home. Don't go to school, work, or public areas. And don't use public transportation, ride-shares, or taxis unless you have no choice. Leave your home only if you need to get medical care. But call the doctor's office first so they know you're coming. And wear a cloth face cover. Ask before leaving isolation. Talk with your doctor or other health professional about when it will be safe for you to leave isolation. Wear a cloth face cover when you are around other people. It can help stop the spread of the virus when you cough or sneeze. Limit contact with people in your home. If possible, stay in a separate bedroom and use a separate bathroom. Avoid contact with pets and other animals. If possible, have a friend or family member care for them while you're sick.      Cover your mouth and nose with a tissue when you cough or sneeze. Then throw the tissue in the trash right away. Wash your hands often, especially after you cough or sneeze. Use soap and water, and scrub for at least 20 seconds. If soap and water aren't available, use an alcohol-based hand . Don't share personal household items. These include bedding, towels, cups and glasses, and eating utensils. Clean and disinfect your home every day. Use household  or disinfectant wipes or sprays. Take special care to clean things that you grab with your hands. These include doorknobs, remote controls, phones, and handles on your refrigerator and microwave. And don't forget countertops, tabletops, bathrooms, and computer keyboards. Take acetaminophen (Tylenol) to relieve fever and body aches. Read and follow all instructions on the label. Current as of: December 18, 2020               Content Version: 12.8  © 2006-2021 [employer]. Care instructions adapted under license by [employer] (which disclaims liability or warranty for this information). If you have questions about a medical condition or this instruction, always ask your healthcare professional. [place] [address] any warranty or liability for your use of this information. Patient armband removed and shredded  The discharge information has been reviewed with the patient and caregiver. The patient and caregiver verbalized understanding. Discharge medications reviewed with the patient and caregiver and appropriate educational materials and side effects teaching were provided.   ___________________________________________________________________________________________________________________________________

## 2021-05-10 NOTE — PROGRESS NOTES
Problem: Self Care Deficits Care Plan (Adult)  Goal: *Acute Goals and Plan of Care (Insert Text)  Description: Initial Occupational Therapy Goals (5/10/2021) Within 7 day(s):    1. Patient will perform toilet transfer with supervision in preparation for bowel and bladder management. 2. Patient will perform bowel and bladder management with supervision for increased independence with ADLs. 3. Patient will perform UB dressing with supervision (including back brace) for increased independence with ADLs. 4. Patient will perform LB dressing with supervision & A/E PRN for increased independence with ADLs. 5. Patient will adhere to back/spinal precautions 100% of the time with 1-2 verbal cues for increased independence with ADLs. 6. Patient will utilize energy conservation techniques with 1 verbal cue(s) for increased independence with ADLs. Outcome: Progressing Towards Goal   OCCUPATIONAL THERAPY EVALUATION    Patient: Gama Brown (5 y.o. female)  Date: 5/10/2021  Primary Diagnosis: Spinal stenosis, lumbar [M48.061]  Procedure(s) (LRB):  LUMBAR 4-SACRAL 1 DECOMPRESSION AND FUSION W/C-ARM,DEPUY (N/A) Day of Surgery   Precautions: Fall, Spinal  PLOF: pt independent for ADLs/functional mobility    ASSESSMENT AND RECOMMENDATIONS:  Based on the objective data described below, the patient presents with BLE decreased ROM and strength affecting LE ADLs. Pt found supine on stretcher, reporting pain 0/10, agreeable to therapy. Educated pt on back precautions including log rolling technique for bed mobility. Pt sat up to EOB with CGA, SBA for upper body dressing, min A to don LSO. Pt required min A to thread B feet through underwear/pants, CGA when standing to pull up to waist. Pt CGA for STS/bathroom mobility/toilet transfer with vc for proper body mechanics. Pt SBA for toileting, CGA when standing at sink for hand washing. Pt ambulated back to recliner chair.  Patient reports spouse will be available to assist with ADLs and LSO management. Patient will benefit from skilled Occupational Therapy intervention to maximize safety/independence with ADLs at d/c.    Education: Reviewed Back Precautions, LSO/Brace management, body mechanics, home safety, importance of moving every hour to assist w/ stiffness & spasms, adaptive strategies and adaptive dressing techniques including clothing modifications with patient verbalizing understanding at this time. Patient will benefit from skilled intervention to address the above impairments. Patient's rehabilitation potential is considered to be Good  Factors which may influence rehabilitation potential include:   [x]             None noted  []             Mental ability/status  []             Medical condition  []             Home/family situation and support systems  []             Safety awareness  []             Pain tolerance/management  []             Other:        PLAN :  Recommendations and Planned Interventions:   [x]               Self Care Training                  [x]      Therapeutic Activities  [x]               Functional Mobility Training   []      Cognitive Retraining  []               Therapeutic Exercises           []      Endurance Activities  []               Balance Training                    []      Neuromuscular Re-Education  []               Visual/Perceptual Training     [x]      Home Safety Training  [x]               Patient Education                   [x]      Family Training/Education  []               Other (comment):    Frequency/Duration: Patient will be followed by occupational therapy 1-2 times per day/4-7 days per week to address goals. Discharge Recommendations: Home Health with responsible adult care at least 24 hours upon hospital d/c  Further Equipment Recommendations for Discharge: N/A     SUBJECTIVE:   Patient stated it's not really hurting right now.     OBJECTIVE DATA SUMMARY:     Past Medical History:   Diagnosis Date    Arthritis     Cancer ([facility] [address]     squam cell leg    Diabetes ([facility] [address]     pre diabetic    Hypertension 2000    Psychiatric disorder     depression, anxiety    Thyroid disease      Past Surgical History:   Procedure Laterality Date    HX APPENDECTOMY      HX CATARACT REMOVAL Bilateral     HX CERVICAL FUSION  2021    HX GASTRIC BYPASS  2003    HX GYN      BTL    HX ORTHOPAEDIC Left     bunyonectomy    HX ORTHOPAEDIC Left     foot reconstruction    DE BREAST SURGERY PROCEDURE UNLISTED      reduction     Barriers to Learning/Limitations: yes;  physical and altered mental status (i.e.Sedation, Confusion)  Compensate with: visual, verbal, tactile, kinesthetic cues/model    Home Situation/Prior Level of Function:   Home Situation  Home Environment: Private residence  # Steps to Enter: 3  Rails to Enter: No  One/Two Story Residence: One story  Living Alone: No  Support Systems: Spouse/Significant Other/Partner  Patient Expects to be Discharged to[de-identified] Private residence  Current DME Used/Available at Home: Walker, rolling  Tub or Shower Type: Shower  []  Right hand dominant   []  Left hand dominant    Cognitive/Behavioral Status:  Neurologic State: Drowsy  Orientation Level: Oriented X4  Cognition: Follows commands  Safety/Judgement: Fall prevention;Home safety    Skin: lumbar incision w/ dressing/Mepilex   Edema: compression hose in place & applied ice     Coordination: BUE  Coordination: Within functional limits  Fine Motor Skills-Upper: Left Intact; Right Intact    Gross Motor Skills-Upper: Left Intact; Right Intact    Balance:  Sitting: Intact  Standing: Intact; With support    Strength: BUE  Strength: Generally decreased, functional    Tone & Sensation:BUE  Tone: Normal  Sensation: Intact    Range of Motion: BUE  AROM: Generally decreased, functional    Functional Mobility and Transfers for ADLs:  Bed Mobility:  Rolling: Contact guard assistance(vc)  Supine to Sit: Contact guard assistance(vc)  Scooting: Contact guard assistance    Transfers:  Sit to Stand: Contact guard assistance; Additional time(vc)   Toilet Transfer : Contact guard assistance   Bathroom Mobility: Contact guard assistance    ADL Assessment:  Feeding: Independent  Oral Facial Hygiene/Grooming: Contact guard assistance  Bathing: Minimum assistance  Upper Body Dressing: Supervision  Lower Body Dressing: Minimum assistance  Toileting: Stand by assistance    ADL Intervention:  Grooming  Grooming Assistance: Contact guard assistance  Position Performed: Standing  Washing Hands: Contact guard assistance    Upper Body Dressing Assistance  Dressing Assistance: Minimum assistance  Orthotics(Brace): Minimum assistance  Pullover Shirt: Stand-by assistance    Lower Body Dressing Assistance  Dressing Assistance: Minimum assistance  Underpants: Minimum assistance  Pants With Elastic Waist: Minimum assistance  Leg Crossed Method Used: Yes  Position Performed: Seated edge of bed  Cues: Verbal cues provided;Visual cues provided    Toileting  Toileting Assistance: Stand-by assistance  Bladder Hygiene: Stand-by assistance  Clothing Management: Stand-by assistance    Cognitive Retraining  Safety/Judgement: Fall prevention;Home safety    Pain:  Pain level pre-treatment: 0/10  Pain level post-treatment: 0/10  Pain Intervention(s): Medication administer by Nursing (see MAR); Rest, Ice, Repositioning   Response to intervention: Nurse notified, see doc flow sheet    Activity Tolerance:   Fair. Patient able to stand ~5 minute(s). Patient able to complete ADLs with intermittent rest breaks. Patient limited by pain, strength, ROM. Patient unsteady. Please refer to the flowsheet for vital signs taken during this treatment.   After treatment:   [x]  Patient left in no apparent distress sitting up in chair  []  Patient sitting on EOB  []  Patient left in no apparent distress in bed  [x]  Call bell left within reach  [x]  Nursing notified  [x]  Caregiver present  [x]  Ice applied  []  SCD's on while back in bed  [] Bed alarm activated    COMMUNICATION/EDUCATION:   Communication/Collaboration:  [x]       Role of Occupational Therapy in the acute care setting. [x]      Home safety education was provided and the patient/caregiver indicated understanding. [x]      Patient/family have participated as able in goal setting and plan of care. [x]      Patient/family agree to work toward stated goals and plan of care. []      Patient understands intent and goals of therapy, but is neutral about his/her participation. []      Patient is unable to participate in plan of care at this time. Thank you for this referral.  Magda Dominguez, OTR/L  Time Calculation: 38 mins    Eval Complexity: History: MEDIUM Complexity : Expanded review of history including physical, cognitive and psychosocial  history ; Examination: MEDIUM Complexity : 3-5 performance deficits relating to physical, cognitive , or psychosocial skils that result in activity limitations and / or participation restrictions; Decision Making:MEDIUM Complexity : Patient may present with comorbidities that affect occupational performnce.  Miniml to moderate modification of tasks or assistance (eg, physical or verbal ) with assesment(s) is necessary to enable patient to complete evaluation

## 2021-05-10 NOTE — INTERVAL H&P NOTE
Update History & Physical 
 
The Patient's History and Physical was reviewed with the patient and I examined the patient. There was no change. The surgical site was confirmed by the patient and me. Plan:  The risk, benefits, expected outcome, and alternative to the recommended procedure have been discussed with the patient. Patient understands and wants to proceed with the procedure.  
 
Electronically signed by Sidney Corona MD on 5/10/2021 at 8:31 AM

## 2021-05-10 NOTE — PERIOP NOTES
Reviewed PTA medication list with patient/caregiver and patient/caregiver denies any additional medications. Patient admits to having a responsible adult care for them at home for at least 24 hours after surgery. Patient encouraged to use gown warming system and informed that using said warming gown to regulate body temperature prior to a procedure has been shown to help reduce the risks of blood clots and infection. Patient's pharmacy of choice verified and documented in PTA medication section. Dual skin assessment & fall risk band verification completed with OVI Ye.

## 2021-05-10 NOTE — PERIOP NOTES
Discharge instructions given to th patient and her . AVS med list reviewed for duplicates. Opportunity for questions provided.

## 2021-05-10 NOTE — ANESTHESIA PREPROCEDURE EVALUATION
Relevant Problems   No relevant active problems       Anesthetic History   No history of anesthetic complications            Review of Systems / Medical History  Patient summary reviewed, nursing notes reviewed and pertinent labs reviewed    Pulmonary  Within defined limits                 Neuro/Psych         Psychiatric history     Cardiovascular    Hypertension              Exercise tolerance: >4 METS     GI/Hepatic/Renal  Within defined limits              Endo/Other    Diabetes  Hypothyroidism  Arthritis     Other Findings              Physical Exam    Airway  Mallampati: II  TM Distance: 4 - 6 cm  Neck ROM: decreased range of motion   Mouth opening: Normal     Cardiovascular  Regular rate and rhythm,  S1 and S2 normal,  no murmur, click, rub, or gallop  Rhythm: regular  Rate: normal         Dental  No notable dental hx       Pulmonary  Breath sounds clear to auscultation               Abdominal  GI exam deferred       Other Findings            Anesthetic Plan    ASA: 2  Anesthesia type: general          Induction: Intravenous  Anesthetic plan and risks discussed with: Patient

## 2021-05-10 NOTE — OP NOTES
Patient: Erick Correa MRN: [id number]  SSN: [id number]    YOB: 1955  Age: 74 y.o. Sex: female        Date of Procedure: 5/10/2021   Preoperative Diagnosis: OBESITY,LUMBAR DISC DEGENERATION,LUMBOSACRAL DISC DEGENERATION,LUMBAGO,LUMBOSACRAL SPONDYLOLISTHESIS,HYPERTENSION  Postoperative Diagnosis: OBESITY,LUMBAR DISC DEGENERATION,LUMBOSACRAL DISC DEGENERATION,LUMBAGO,LUMBOSACRAL SPONDYLOLISTHESIS,HYPERTENSION    Procedure: Procedure(s):  LUMBAR 4-SACRAL 1 DECOMPRESSION AND FUSION W/C-ARM,DEPUY  Surgeon(s) and Role:     Conner Longoria MD - Primary  Assistant: Marva Daniel PA-C  OR Assitance: Physician Assistant: GEORGE Santos  Surg Asst-1: Edinson Driver  Anesthesia: General   Estimated Blood Loss: 150cc  Fluids: 1000cc   Specimens: * No specimens in log *   Findings: same  Complications: none  Implants:   Implant Name Type Inv. Item Serial No.  Lot No. LRB No. Used Action   FIBERGRA[employer] BG PUTTY    PROSIDYAN_WD [id number] N/A 1 Implanted   SET SCR SPNL L25MM DIA5. 5MM TI FOR 5.5MM CURTIS EXPEDIUM - [id number]  SET SCR SPNL L25MM DIA5. 5MM TI FOR 5.5MM CURTIS EXPEDIUM  [employer] DEPU[employer] SYNTHES SPINE_WD [id number] N/A 6 Implanted   CURTIS SPNL L70MM [id number] TI PRELORDOSED EXPEDIUM - [id number]  CURTIS SPNL L70MM [id number] TI PRELORDOSED EXPEDIUM  [facility] DEPUY SYNTHES SPINE_WD [id number] N/A 2 Implanted   SCREW SPNL L50MM DIA7MM TI SGL INNR POLYAX FOR 5.5MM CURTIS - [id number]  SCREW SPNL L50MM DIA7MM TI SGL INNR POLYAX FOR 5.5MM CURTIS  [employer] DEPUY SYNTHES SPINE_WD [id number] N/A 2 Implanted   SCREW SPNL L45MM DIA7MM TI SGL INNR POLYAX FOR 5.5MM CURTIS - [id number]  SCREW SPNL L45MM DIA7MM TI SGL INNR POLYAX FOR 5.5MM CURTIS  [facility] DEPUY SYNTHES SPINE_WD [id number] N/A 4 Implanted         Indications: This is a 74y.o. year-old female who presents with significant back   and bilateral lower extremity pain, worsening ability to do activities of daily living.  X-rays and MRI scan revealing severe spinal stenosis, degenerative disk disease and disk herniation. Having failed conservative care, he comes for operative intervention. DESCRIPTION OF PROCEDURE: After correct identification, the patient was   taken to the operating room, placed supine on the table, general   endotracheal anesthesia induced. 2grams of Ancef was given. Patient [facility] then [facility] Lumbar spine was prepped and draped in the usual sterile fashion. Midline incision was made in the lumbar spine, taken down to the spinous processes of L4-S1. The posterior transverse processes bilaterally were expose at L4-S1 as seen on intraoperative fluoroscopy. Gelpi retractors were then placed. The wound was then irrigated. Complete wide laminectomy was performed at L5 as well as the inferior   half of L4 bilaterally and the superior half of S1 bilaterally. Removal of more than 1/2 of the medial facets bilaterally allowed excellent midline decompression. Foraminotomies which included undercutting the pars intra-articularis were then performed bilaterally at L4-S1 until a Penfield 3 was easily passed through each of the neural foramen. This allowed visualization of the L4, L5 and S1 nerve roots. The wound   was then irrigated. Bur was then used to open the posterior aspect of the   pedicles bilaterally at L4-S1. A gearshift probe was then placed through   each of these posterior bur holes, through the pedicle, into vertebral body   under intraoperative fluoroscopy. Ball-tipped probe was then placed through   each gearshift tracts, ensuring the gearshift had only gone through the bone. Pedicle screws from [employer] spinal system were then placed bilaterally at L4-S1. Rods were then fixed to the pedicle screws using the locking caps. Each of these caps were then torqued into position. Intraoperative x-ray revealed excellent alignment of the  hardware and anatomy. Wound was then irrigated with 1000cc of pulse lavage irrigation.  Bur was then used to open the posterior aspect of the facet joints and transverse process bilaterally as well as removing of the cartilage surface of the facet joints. Bone graft that was taken from the laminectomy site was then placed in the   posterolateral gutters as well as in facet joints. Drain was then placed. Fascia was then closed using #1 Vicryl suture. Subcutaneous tissue   approximated with 2-0 Vicryl suture. Skin approximated with staples. Sterile dressing was applied. The patient tolerated the procedure well and taken to Recovery room in good   condition. Assistant surgeon was needed throughout the procedure for managing bleeding, improving visualization and closure of the surgical wounds.

## 2021-05-12 ENCOUNTER — TELEPHONE (OUTPATIENT)
Dept: OTHER | Age: 66
End: 2021-05-12

## (undated) DEVICE — 3.0MM PRECISION NEURO (MATCH HEAD)

## (undated) DEVICE — FLOSEAL HEMOSTATIC MATRIX, 5 ML: Brand: FLOSEAL

## (undated) DEVICE — Device

## (undated) DEVICE — SUTURE PROL SZ 3-0 L18IN NONABSORBABLE BLU L19MM PC-5 3/8 8632G

## (undated) DEVICE — SOLUTION LACTATED RINGERS INJECTION USP

## (undated) DEVICE — SOL IRRIGATION INJ NACL 0.9% 500ML BTL

## (undated) DEVICE — BIPOLAR SEALER 23-121-1 AQM EVS: Brand: AQUAMANTYS™

## (undated) DEVICE — RESERVOIR,SUCTION,100CC,SILICONE: Brand: MEDLINE

## (undated) DEVICE — SYRINGE IRRIG 60ML SFT PLIABLE BLB EZ TO GRP 1 HND USE W/

## (undated) DEVICE — HANDPIECE SET WITH HIGH FLOW TIP AND SUCTION TUBE: Brand: INTERPULSE

## (undated) DEVICE — KIT EVAC 0.13IN RECT TB DIA10FR 400CC PVC 3 SPR Y CONN DRN

## (undated) DEVICE — SUTURE VCRL + SZ 2-0 L18IN ABSRB VLT CT-2 1/2 CIR TAPERCUT VCP726D

## (undated) DEVICE — Z DISCONTINUED USE (MFG CAT 7984-37) SOLUTION IV SODIUM CHL 0.9% 100 ML INJ

## (undated) DEVICE — DRAIN SURG 7FR END PERF 1/8IN SIL RND SMOOTH HEAT POLISHED

## (undated) DEVICE — PACK PROCEDURE SURG LAMINECTOMY SPINE CUST

## (undated) DEVICE — GARMENT,MEDLINE,DVT,INT,CALF,MED, GEN2: Brand: MEDLINE

## (undated) DEVICE — SYR LR LCK 1ML GRAD NSAF 30ML --

## (undated) DEVICE — NEEDLE HYPO 18GA L1.5IN PNK POLYPR HUB S STL THN WALL FILL

## (undated) DEVICE — SUTURE ABSORBABLE BRAIDED 1-0 OS-8 CR 3X18 IN UD VICRYL J757T

## (undated) DEVICE — TOTAL TRAY, DB, 100% SILI FOLEY, 16FR 10: Brand: MEDLINE

## (undated) DEVICE — DERMABOND SKIN ADH 0.7ML --